# Patient Record
Sex: MALE | Race: WHITE | NOT HISPANIC OR LATINO | Employment: FULL TIME | URBAN - METROPOLITAN AREA
[De-identification: names, ages, dates, MRNs, and addresses within clinical notes are randomized per-mention and may not be internally consistent; named-entity substitution may affect disease eponyms.]

---

## 2017-02-07 ENCOUNTER — HOSPITAL ENCOUNTER (OUTPATIENT)
Dept: LAB | Facility: HOSPITAL | Age: 42
Discharge: HOME | End: 2017-02-07
Attending: FAMILY MEDICINE | Admitting: FAMILY MEDICINE

## 2017-02-07 LAB
ALBUMIN SERPL-MCNC: 3.5 G/DL (ref 3.5–4.8)
ALBUMIN/GLOB SERPL: 1.1 {RATIO} (ref 1–1.7)
ALP SERPL-CCNC: 78 IU/L (ref 32–91)
ALT SERPL-CCNC: 19 IU/L (ref 17–63)
ANION GAP SERPL CALC-SCNC: 13.1 MMOL/L (ref 10–20)
AST SERPL-CCNC: 17 IU/L (ref 15–41)
BILIRUB SERPL-MCNC: 0.5 MG/DL (ref 0.3–1.2)
BUN SERPL-MCNC: 8 MG/DL (ref 8–20)
BUN/CREAT SERPL: 11.4 (ref 6.2–20.3)
CALCIUM SERPL-MCNC: 9.3 MG/DL (ref 8.9–10.3)
CHLORIDE SERPL-SCNC: 104 MMOL/L (ref 101–111)
CHOLEST SERPL-MCNC: 198 MG/DL
CHOLEST/HDLC SERPL: 7.9 {RATIO}
CONV CO2: 27 MMOL/L (ref 22–32)
CONV LDL CHOLESTEROL DIRECT: 119 MG/DL (ref 0–100)
CONV TOTAL PROTEIN: 6.7 G/DL (ref 6.1–7.9)
CREAT UR-MCNC: 0.7 MG/DL (ref 0.7–1.2)
GLOBULIN UR ELPH-MCNC: 3.2 G/DL (ref 2.5–3.8)
GLUCOSE SERPL-MCNC: 174 MG/DL (ref 65–99)
HDLC SERPL-MCNC: 25 MG/DL
LDLC/HDLC SERPL: 4.8 {RATIO}
LIPID INTERPRETATION: ABNORMAL
POTASSIUM SERPL-SCNC: 4.1 MMOL/L (ref 3.6–5.1)
SODIUM SERPL-SCNC: 140 MMOL/L (ref 136–144)
TRIGL SERPL-MCNC: 291 MG/DL
VLDLC SERPL CALC-MCNC: 53.9 MG/DL

## 2017-08-09 ENCOUNTER — HOSPITAL ENCOUNTER (OUTPATIENT)
Dept: LAB | Facility: HOSPITAL | Age: 42
Discharge: HOME | End: 2017-08-09
Attending: FAMILY MEDICINE | Admitting: FAMILY MEDICINE

## 2017-08-09 LAB
ALBUMIN SERPL-MCNC: 3.9 G/DL (ref 3.5–4.8)
ALBUMIN/GLOB SERPL: 1.3 {RATIO} (ref 1–1.7)
ALP SERPL-CCNC: 83 IU/L (ref 32–91)
ALT SERPL-CCNC: 21 IU/L (ref 17–63)
ANION GAP SERPL CALC-SCNC: 13.9 MMOL/L (ref 10–20)
AST SERPL-CCNC: 18 IU/L (ref 15–41)
BILIRUB SERPL-MCNC: 0.6 MG/DL (ref 0.3–1.2)
BUN SERPL-MCNC: 9 MG/DL (ref 8–20)
BUN/CREAT SERPL: 12.9 (ref 6.2–20.3)
CALCIUM SERPL-MCNC: 9.2 MG/DL (ref 8.9–10.3)
CHLORIDE SERPL-SCNC: 103 MMOL/L (ref 101–111)
CHOLEST SERPL-MCNC: 164 MG/DL
CHOLEST/HDLC SERPL: 6.1 {RATIO}
CONV CO2: 25 MMOL/L (ref 22–32)
CONV LDL CHOLESTEROL DIRECT: 92 MG/DL (ref 0–100)
CONV TOTAL PROTEIN: 6.9 G/DL (ref 6.1–7.9)
CREAT UR-MCNC: 0.7 MG/DL (ref 0.7–1.2)
GLOBULIN UR ELPH-MCNC: 3 G/DL (ref 2.5–3.8)
GLUCOSE SERPL-MCNC: 262 MG/DL (ref 65–99)
HDLC SERPL-MCNC: 27 MG/DL
LDLC/HDLC SERPL: 3.4 {RATIO}
LIPID INTERPRETATION: ABNORMAL
POTASSIUM SERPL-SCNC: 3.9 MMOL/L (ref 3.6–5.1)
SODIUM SERPL-SCNC: 138 MMOL/L (ref 136–144)
T4 FREE SERPL-MCNC: 0.83 NG/DL (ref 0.58–1.64)
TRIGL SERPL-MCNC: 218 MG/DL
TSH SERPL-ACNC: 1.69 UIU/ML (ref 0.34–5.6)
VLDLC SERPL CALC-MCNC: 45.1 MG/DL

## 2018-02-06 ENCOUNTER — HOSPITAL ENCOUNTER (OUTPATIENT)
Dept: LAB | Facility: HOSPITAL | Age: 43
Discharge: HOME | End: 2018-02-06
Attending: FAMILY MEDICINE | Admitting: FAMILY MEDICINE

## 2018-02-06 LAB
ALBUMIN SERPL-MCNC: 4 G/DL (ref 3.5–4.8)
ALBUMIN/GLOB SERPL: 1.2 {RATIO} (ref 1–1.7)
ALP SERPL-CCNC: 91 IU/L (ref 32–91)
ALT SERPL-CCNC: 22 IU/L (ref 17–63)
ANION GAP SERPL CALC-SCNC: 13.2 MMOL/L (ref 10–20)
AST SERPL-CCNC: 20 IU/L (ref 15–41)
BILIRUB SERPL-MCNC: 0.7 MG/DL (ref 0.3–1.2)
BUN SERPL-MCNC: 7 MG/DL (ref 8–20)
BUN/CREAT SERPL: 8.8 (ref 6.2–20.3)
CALCIUM SERPL-MCNC: 9.5 MG/DL (ref 8.9–10.3)
CHLORIDE SERPL-SCNC: 98 MMOL/L (ref 101–111)
CHOLEST SERPL-MCNC: 183 MG/DL
CHOLEST/HDLC SERPL: 6.1 {RATIO}
CONV CO2: 26 MMOL/L (ref 22–32)
CONV LDL CHOLESTEROL DIRECT: 107 MG/DL (ref 0–100)
CONV MICROALBUM.,U,RANDOM: 26 MG/L
CONV TOTAL PROTEIN: 7.3 G/DL (ref 6.1–7.9)
CREAT 24H UR-MCNC: 336.9 MG/DL
CREAT UR-MCNC: 0.8 MG/DL (ref 0.7–1.2)
GLOBULIN UR ELPH-MCNC: 3.3 G/DL (ref 2.5–3.8)
GLUCOSE SERPL-MCNC: 310 MG/DL (ref 65–99)
HDLC SERPL-MCNC: 30 MG/DL
LDLC/HDLC SERPL: 3.5 {RATIO}
LIPID INTERPRETATION: ABNORMAL
MICROALBUMIN/CREAT UR: 7.7 UG/MG
POTASSIUM SERPL-SCNC: 4.2 MMOL/L (ref 3.6–5.1)
SODIUM SERPL-SCNC: 133 MMOL/L (ref 136–144)
TRIGL SERPL-MCNC: 246 MG/DL
VLDLC SERPL CALC-MCNC: 46.3 MG/DL

## 2018-08-11 ENCOUNTER — HOSPITAL ENCOUNTER (OUTPATIENT)
Dept: LAB | Facility: HOSPITAL | Age: 43
Discharge: HOME | End: 2018-08-11
Attending: FAMILY MEDICINE | Admitting: FAMILY MEDICINE

## 2018-08-11 LAB
ALBUMIN SERPL-MCNC: 3.5 G/DL (ref 3.5–4.8)
ALBUMIN/GLOB SERPL: 1.3 {RATIO} (ref 1–1.7)
ALP SERPL-CCNC: 66 IU/L (ref 32–91)
ALT SERPL-CCNC: 13 IU/L (ref 17–63)
ANION GAP SERPL CALC-SCNC: 8.4 MMOL/L (ref 10–20)
AST SERPL-CCNC: 11 IU/L (ref 15–41)
BASOPHILS # BLD AUTO: 0.1 10*3/UL (ref 0–0.2)
BASOPHILS NFR BLD AUTO: 1 % (ref 0–2)
BILIRUB SERPL-MCNC: 0.4 MG/DL (ref 0.3–1.2)
BUN SERPL-MCNC: 7 MG/DL (ref 8–20)
BUN/CREAT SERPL: 10 (ref 6.2–20.3)
CALCIUM SERPL-MCNC: 8.9 MG/DL (ref 8.9–10.3)
CHLORIDE SERPL-SCNC: 104 MMOL/L (ref 101–111)
CHOLEST SERPL-MCNC: 144 MG/DL
CHOLEST/HDLC SERPL: 4.4 {RATIO}
CONV CO2: 29 MMOL/L (ref 22–32)
CONV LDL CHOLESTEROL DIRECT: 86 MG/DL (ref 0–100)
CONV TOTAL PROTEIN: 6.3 G/DL (ref 6.1–7.9)
CREAT UR-MCNC: 0.7 MG/DL (ref 0.7–1.2)
DIFFERENTIAL METHOD BLD: (no result)
EOSINOPHIL # BLD AUTO: 0.2 10*3/UL (ref 0–0.3)
EOSINOPHIL # BLD AUTO: 2 % (ref 0–3)
ERYTHROCYTE [DISTWIDTH] IN BLOOD BY AUTOMATED COUNT: 12.9 % (ref 11.5–14.5)
GLOBULIN UR ELPH-MCNC: 2.8 G/DL (ref 2.5–3.8)
GLUCOSE SERPL-MCNC: 141 MG/DL (ref 65–99)
HCT VFR BLD AUTO: 38.5 % (ref 40–54)
HDLC SERPL-MCNC: 32 MG/DL
HGB BLD-MCNC: 12.9 G/DL (ref 14–18)
LDLC/HDLC SERPL: 2.6 {RATIO}
LIPID INTERPRETATION: ABNORMAL
LYMPHOCYTES # BLD AUTO: 3.4 10*3/UL (ref 0.8–4.8)
LYMPHOCYTES NFR BLD AUTO: 37 % (ref 18–42)
MCH RBC QN AUTO: 28.7 PG (ref 26–32)
MCHC RBC AUTO-ENTMCNC: 33.5 G/DL (ref 32–36)
MCV RBC AUTO: 85.5 FL (ref 80–94)
MONOCYTES # BLD AUTO: 0.5 10*3/UL (ref 0.1–1.3)
MONOCYTES NFR BLD AUTO: 5 % (ref 2–11)
NEUTROPHILS # BLD AUTO: 5.1 10*3/UL (ref 2.3–8.6)
NEUTROPHILS NFR BLD AUTO: 55 % (ref 50–75)
NRBC BLD AUTO-RTO: 0 /100{WBCS}
NRBC/RBC NFR BLD MANUAL: 0 10*3/UL
PLATELET # BLD AUTO: 420 10*3/UL (ref 150–450)
PMV BLD AUTO: 6.5 FL (ref 7.4–10.4)
POTASSIUM SERPL-SCNC: 4.4 MMOL/L (ref 3.6–5.1)
RBC # BLD AUTO: 4.5 10*6/UL (ref 4.6–6)
SODIUM SERPL-SCNC: 137 MMOL/L (ref 136–144)
TRIGL SERPL-MCNC: 118 MG/DL
VLDLC SERPL CALC-MCNC: 26.1 MG/DL
WBC # BLD AUTO: 9.3 10*3/UL (ref 4.5–11.5)

## 2019-01-26 ENCOUNTER — HOSPITAL ENCOUNTER (OUTPATIENT)
Dept: LAB | Facility: HOSPITAL | Age: 44
Discharge: HOME | End: 2019-01-26
Attending: FAMILY MEDICINE | Admitting: FAMILY MEDICINE

## 2019-01-26 LAB
ALBUMIN SERPL-MCNC: 3.6 G/DL (ref 3.5–4.8)
ALBUMIN/GLOB SERPL: 1.3 {RATIO} (ref 1–1.7)
ALP SERPL-CCNC: 64 IU/L (ref 32–91)
ALT SERPL-CCNC: 12 IU/L (ref 17–63)
ANION GAP SERPL CALC-SCNC: 10.9 MMOL/L (ref 10–20)
AST SERPL-CCNC: 14 IU/L (ref 15–41)
BILIRUB SERPL-MCNC: 0.5 MG/DL (ref 0.3–1.2)
BUN SERPL-MCNC: 11 MG/DL (ref 8–20)
BUN/CREAT SERPL: 12.2 (ref 6.2–20.3)
CALCIUM SERPL-MCNC: 8.9 MG/DL (ref 8.9–10.3)
CHLORIDE SERPL-SCNC: 103 MMOL/L (ref 101–111)
CHOLEST SERPL-MCNC: 128 MG/DL
CHOLEST/HDLC SERPL: 3.2 {RATIO}
CONV CO2: 27 MMOL/L (ref 22–32)
CONV LDL CHOLESTEROL DIRECT: 80 MG/DL (ref 0–100)
CONV MICROALBUM.,U,RANDOM: 13 MG/L
CONV TOTAL PROTEIN: 6.4 G/DL (ref 6.1–7.9)
CREAT 24H UR-MCNC: 196 MG/DL
CREAT UR-MCNC: 0.9 MG/DL (ref 0.7–1.2)
GLOBULIN UR ELPH-MCNC: 2.8 G/DL (ref 2.5–3.8)
GLUCOSE SERPL-MCNC: 104 MG/DL (ref 65–99)
HDLC SERPL-MCNC: 40 MG/DL
LDLC/HDLC SERPL: 2 {RATIO}
LIPID INTERPRETATION: NORMAL
MICROALBUMIN/CREAT UR: 6.6 UG/MG
POTASSIUM SERPL-SCNC: 3.9 MMOL/L (ref 3.6–5.1)
SODIUM SERPL-SCNC: 137 MMOL/L (ref 136–144)
TRIGL SERPL-MCNC: 59 MG/DL
VLDLC SERPL CALC-MCNC: 8 MG/DL

## 2019-09-15 ENCOUNTER — APPOINTMENT (OUTPATIENT)
Dept: CT IMAGING | Facility: HOSPITAL | Age: 44
End: 2019-09-15

## 2019-09-15 ENCOUNTER — HOSPITAL ENCOUNTER (EMERGENCY)
Facility: HOSPITAL | Age: 44
Discharge: HOME | End: 2019-09-15
Admitting: EMERGENCY MEDICINE

## 2019-09-15 VITALS
WEIGHT: 220 LBS | DIASTOLIC BLOOD PRESSURE: 128 MMHG | TEMPERATURE: 98.7 F | SYSTOLIC BLOOD PRESSURE: 161 MMHG | BODY MASS INDEX: 30.8 KG/M2 | HEIGHT: 71 IN | RESPIRATION RATE: 16 BRPM | HEART RATE: 113 BPM | OXYGEN SATURATION: 98 %

## 2019-09-15 DIAGNOSIS — H72.92 PERFORATED TYMPANIC MEMBRANE ON EXAMINATION, LEFT: ICD-10-CM

## 2019-09-15 DIAGNOSIS — H92.02 LEFT EAR PAIN: Primary | ICD-10-CM

## 2019-09-15 DIAGNOSIS — H65.92 MIDDLE EAR EFFUSION, LEFT: ICD-10-CM

## 2019-09-15 DIAGNOSIS — R42 DIZZINESS: ICD-10-CM

## 2019-09-15 LAB
ANION GAP SERPL CALCULATED.3IONS-SCNC: 14.5 MMOL/L (ref 5–15)
BASOPHILS # BLD AUTO: 0.1 10*3/MM3 (ref 0–0.2)
BASOPHILS NFR BLD AUTO: 0.9 % (ref 0–1.5)
BUN BLD-MCNC: 7 MG/DL (ref 8–20)
BUN/CREAT SERPL: 7.8 (ref 6.2–20.3)
CALCIUM SPEC-SCNC: 8.5 MG/DL (ref 8.9–10.3)
CHLORIDE SERPL-SCNC: 101 MMOL/L (ref 101–111)
CO2 SERPL-SCNC: 24 MMOL/L (ref 22–32)
CREAT BLD-MCNC: 0.9 MG/DL (ref 0.7–1.2)
DEPRECATED RDW RBC AUTO: 42.4 FL (ref 37–54)
EOSINOPHIL # BLD AUTO: 0.1 10*3/MM3 (ref 0–0.4)
EOSINOPHIL NFR BLD AUTO: 1.3 % (ref 0.3–6.2)
ERYTHROCYTE [DISTWIDTH] IN BLOOD BY AUTOMATED COUNT: 13.4 % (ref 12.3–15.4)
GFR SERPL CREATININE-BSD FRML MDRD: 93 ML/MIN/1.73
GLUCOSE BLD-MCNC: 145 MG/DL (ref 65–99)
HCT VFR BLD AUTO: 36 % (ref 37.5–51)
HGB BLD-MCNC: 12.2 G/DL (ref 13–17.7)
LYMPHOCYTES # BLD AUTO: 2.7 10*3/MM3 (ref 0.7–3.1)
LYMPHOCYTES NFR BLD AUTO: 29.4 % (ref 19.6–45.3)
MCH RBC QN AUTO: 30.5 PG (ref 26.6–33)
MCHC RBC AUTO-ENTMCNC: 33.9 G/DL (ref 31.5–35.7)
MCV RBC AUTO: 89.8 FL (ref 79–97)
MONOCYTES # BLD AUTO: 0.7 10*3/MM3 (ref 0.1–0.9)
MONOCYTES NFR BLD AUTO: 7.7 % (ref 5–12)
NEUTROPHILS # BLD AUTO: 5.6 10*3/MM3 (ref 1.7–7)
NEUTROPHILS NFR BLD AUTO: 60.7 % (ref 42.7–76)
NRBC BLD AUTO-RTO: 0 /100 WBC (ref 0–0.2)
PLATELET # BLD AUTO: 393 10*3/MM3 (ref 140–450)
PMV BLD AUTO: 7.7 FL (ref 6–12)
POTASSIUM BLD-SCNC: 3.5 MMOL/L (ref 3.6–5.1)
RBC # BLD AUTO: 4.01 10*6/MM3 (ref 4.14–5.8)
SODIUM BLD-SCNC: 136 MMOL/L (ref 136–144)
WBC NRBC COR # BLD: 9.1 10*3/MM3 (ref 3.4–10.8)

## 2019-09-15 PROCEDURE — 93005 ELECTROCARDIOGRAM TRACING: CPT | Performed by: PHYSICIAN ASSISTANT

## 2019-09-15 PROCEDURE — 25010000002 DIPHENHYDRAMINE PER 50 MG: Performed by: PHYSICIAN ASSISTANT

## 2019-09-15 PROCEDURE — 96375 TX/PRO/DX INJ NEW DRUG ADDON: CPT

## 2019-09-15 PROCEDURE — 25010000002 KETOROLAC TROMETHAMINE PER 15 MG: Performed by: PHYSICIAN ASSISTANT

## 2019-09-15 PROCEDURE — 96374 THER/PROPH/DIAG INJ IV PUSH: CPT

## 2019-09-15 PROCEDURE — 80048 BASIC METABOLIC PNL TOTAL CA: CPT | Performed by: PHYSICIAN ASSISTANT

## 2019-09-15 PROCEDURE — 70450 CT HEAD/BRAIN W/O DYE: CPT

## 2019-09-15 PROCEDURE — 85025 COMPLETE CBC W/AUTO DIFF WBC: CPT | Performed by: PHYSICIAN ASSISTANT

## 2019-09-15 PROCEDURE — 94770: CPT

## 2019-09-15 PROCEDURE — 99284 EMERGENCY DEPT VISIT MOD MDM: CPT

## 2019-09-15 RX ORDER — ALPRAZOLAM 1 MG/1
1 TABLET ORAL 4 TIMES DAILY PRN
COMMUNITY

## 2019-09-15 RX ORDER — QUETIAPINE FUMARATE 50 MG/1
50 TABLET, FILM COATED ORAL 2 TIMES DAILY
COMMUNITY

## 2019-09-15 RX ORDER — LISINOPRIL 40 MG/1
40 TABLET ORAL DAILY
COMMUNITY

## 2019-09-15 RX ORDER — HYDRALAZINE HYDROCHLORIDE 25 MG/1
25 TABLET, FILM COATED ORAL DAILY
COMMUNITY

## 2019-09-15 RX ORDER — SODIUM CHLORIDE 0.9 % (FLUSH) 0.9 %
10 SYRINGE (ML) INJECTION AS NEEDED
Status: DISCONTINUED | OUTPATIENT
Start: 2019-09-15 | End: 2019-09-15 | Stop reason: HOSPADM

## 2019-09-15 RX ORDER — ATENOLOL 25 MG/1
25 TABLET ORAL DAILY
COMMUNITY

## 2019-09-15 RX ORDER — CIPROFLOXACIN AND DEXAMETHASONE 3; 1 MG/ML; MG/ML
4 SUSPENSION/ DROPS AURICULAR (OTIC) 2 TIMES DAILY
Qty: 7.5 ML | Refills: 0 | Status: SHIPPED | OUTPATIENT
Start: 2019-09-15

## 2019-09-15 RX ORDER — PREDNISONE 20 MG/1
20 TABLET ORAL DAILY
Qty: 5 TABLET | Refills: 0 | Status: SHIPPED | OUTPATIENT
Start: 2019-09-15 | End: 2019-09-20

## 2019-09-15 RX ORDER — MECLIZINE HYDROCHLORIDE 25 MG/1
25 TABLET ORAL ONCE
Status: COMPLETED | OUTPATIENT
Start: 2019-09-15 | End: 2019-09-15

## 2019-09-15 RX ORDER — PAROXETINE 30 MG/1
60 TABLET, FILM COATED ORAL EVERY MORNING
COMMUNITY

## 2019-09-15 RX ORDER — DIPHENHYDRAMINE HYDROCHLORIDE 50 MG/ML
25 INJECTION INTRAMUSCULAR; INTRAVENOUS ONCE
Status: COMPLETED | OUTPATIENT
Start: 2019-09-15 | End: 2019-09-15

## 2019-09-15 RX ORDER — CYCLOBENZAPRINE HCL 10 MG
10 TABLET ORAL 2 TIMES DAILY PRN
COMMUNITY

## 2019-09-15 RX ORDER — PIOGLITAZONEHYDROCHLORIDE 30 MG/1
45 TABLET ORAL DAILY
COMMUNITY

## 2019-09-15 RX ORDER — AMLODIPINE BESYLATE 10 MG/1
5 TABLET ORAL 2 TIMES DAILY
COMMUNITY

## 2019-09-15 RX ORDER — AMOXICILLIN AND CLAVULANATE POTASSIUM 875; 125 MG/1; MG/1
1 TABLET, FILM COATED ORAL 2 TIMES DAILY
Qty: 20 TABLET | Refills: 0 | Status: SHIPPED | OUTPATIENT
Start: 2019-09-15 | End: 2019-10-23 | Stop reason: HOSPADM

## 2019-09-15 RX ORDER — KETOROLAC TROMETHAMINE 30 MG/ML
30 INJECTION, SOLUTION INTRAMUSCULAR; INTRAVENOUS ONCE
Status: COMPLETED | OUTPATIENT
Start: 2019-09-15 | End: 2019-09-15

## 2019-09-15 RX ADMIN — MECLIZINE HYDROCLORIDE 25 MG: 25 TABLET ORAL at 08:40

## 2019-09-15 RX ADMIN — KETOROLAC TROMETHAMINE 30 MG: 30 INJECTION, SOLUTION INTRAMUSCULAR at 08:40

## 2019-09-15 RX ADMIN — DIPHENHYDRAMINE HYDROCHLORIDE 25 MG: 50 INJECTION, SOLUTION INTRAMUSCULAR; INTRAVENOUS at 08:40

## 2019-09-15 NOTE — ED PROVIDER NOTES
"Subjective   Patient is a 43-year-old male with history of diabetes, hypertension, hyperlipidemia who presents complaining of left facial numbness left ear pain for 1 week.  He states that his sister passed away on 9/7 since then he has had the left sided numbness.  He verbalizes that he felt like the left side of his face was \"drooping\".  He also states that he has increased left ear pain, and describes it as \"I feel like someone stuck a pencil in my left ear\".  He reports some clear left ear drainage. He is also complaining of some discomfort and redness in his left eye.  he is also complaining of headache mostly on the left side, does not radiate, is constant and rates it a 10/10.  He denies history of migraines but states that feels like this is a migraine.  He denies numbness or tingling in his upper and lower extremities.  He denies any chest pain, shortness of breath, abdominal pain, nausea, vomiting, fever.         History provided by:  Patient      Review of Systems   Constitutional: Negative for fever.   HENT: Positive for ear discharge and ear pain. Negative for congestion, hearing loss, rhinorrhea, sinus pressure, sinus pain, sore throat, tinnitus and trouble swallowing.         Left ear pain   Eyes: Positive for pain, redness and visual disturbance.        Blurry vision left eye   Respiratory: Negative for cough, chest tightness, shortness of breath and wheezing.    Cardiovascular: Negative for chest pain and palpitations.   Gastrointestinal: Negative for abdominal pain, diarrhea, nausea and vomiting.   Genitourinary: Negative for dysuria and flank pain.   Musculoskeletal: Negative for back pain and neck pain.   Skin: Negative for rash.   Neurological: Positive for dizziness, light-headedness, numbness and headaches. Negative for syncope, speech difficulty and weakness.        Left face numbness   Psychiatric/Behavioral: Negative for behavioral problems.   All other systems reviewed and are " negative.      Past Medical History:   Diagnosis Date   • Depression    • Diabetes mellitus (CMS/HCC)    • Hyperlipidemia    • Hypertension        Allergies   Allergen Reactions   • Prochlorperazine Unknown (See Comments)       Past Surgical History:   Procedure Laterality Date   • BACK SURGERY         History reviewed. No pertinent family history.    Social History     Socioeconomic History   • Marital status: Single     Spouse name: Not on file   • Number of children: Not on file   • Years of education: Not on file   • Highest education level: Not on file   Tobacco Use   • Smoking status: Never Smoker   Substance and Sexual Activity   • Alcohol use: No     Frequency: Never   • Drug use: No   • Sexual activity: No           Objective   Physical Exam   Constitutional: He is oriented to person, place, and time. He appears well-developed and well-nourished.   HENT:   Head: Normocephalic.   Left Ear: External ear normal. No lacerations. There is drainage and tenderness. No foreign bodies. No mastoid tenderness. Tympanic membrane is perforated. No decreased hearing is noted.   Mouth/Throat: Oropharynx is clear and moist.   Left ear external canal red, some discharge and TM appears ruptured.  TTP left tragus. Denies tenderness to left mastoid or post auricular area.   Eyes: EOM are normal. Pupils are equal, round, and reactive to light. Right eye exhibits no discharge.   Left conjunctiva redness   Neck: Normal range of motion. Neck supple.   Cardiovascular: Normal rate, regular rhythm, normal heart sounds and intact distal pulses.   Pulmonary/Chest: Effort normal and breath sounds normal. No respiratory distress. He has no wheezes. He exhibits no tenderness.   Abdominal: Soft. Bowel sounds are normal. He exhibits no distension. There is no tenderness. There is no rebound and no guarding.   Musculoskeletal: Normal range of motion.   Neurological: He is alert and oriented to person, place, and time. He has normal strength  "and normal reflexes. A sensory deficit is present.   Sensory deficit left side of face  [place] negative for nystagmus   Skin: Skin is warm and dry. Capillary refill takes less than 2 seconds.   Psychiatric: He has a normal mood and affect. His speech is normal and behavior is normal. Judgment and thought content normal. Cognition and memory are normal.   Vitals reviewed.      ECG 12 Lead    Date/Time: 9/15/2019 9:17 AM  Performed by: Rosemarie Umana PA  Authorized by: Rosemarie Umana PA   Interpreted by physician (Dr. Todd)  Comparison: compared with previous ECG from 4/8/2015  Comparison to previous ECG: Sinus tachycardia  Rhythm: sinus rhythm  Rate: normal  QRS axis: normal  Conduction: conduction normal  ST Segments: ST segments normal  T Waves: T waves normal  Other: no other findings  Clinical impression: normal ECG                   ED Course  ED Course as of Sep 15 1140   Sun Sep 15, 2019   1035 Head CT was reviewed shows left mastoid effusion.  Findings were discussed with Dr. Todd we discussed antibiotics steroids and topical otic drops  [MM]   1046 Imaging and results were discussed with the patient.  Advised patient that IV antibiotics steroids and drops  [MM]      ED Course User Index  [MM] Rosemarie Umana PA    BP (!) 161/128   Pulse 113   Temp 98.7 °F (37.1 °C) (Oral)   Resp 16   Ht 180.3 cm (71\")   Wt 99.8 kg (220 lb)   SpO2 98%   BMI 30.68 kg/m²   Labs Reviewed   BASIC METABOLIC PANEL - Abnormal; Notable for the following components:       Result Value    Glucose 145 (*)     BUN 7 (*)     Potassium 3.5 (*)     Calcium 8.5 (*)     All other components within normal limits   CBC WITH AUTO DIFFERENTIAL - Abnormal; Notable for the following components:    RBC 4.01 (*)     Hemoglobin 12.2 (*)     Hematocrit 36.0 (*)     All other components within normal limits   CBC AND DIFFERENTIAL    Narrative:     The following orders were created for panel order CBC & Differential.  Procedure   "                             Abnormality         Status                     ---------                               -----------         ------                     CBC Auto Differential[[id number]]        Abnormal            Final result                 Please view results for these tests on the individual orders.     Medications   sodium chloride 0.9 % flush 10 mL (not administered)   meclizine (ANTIVERT) tablet 25 mg (25 mg Oral Given 9/15/19 0840)   diphenhydrAMINE (BENADRYL) injection 25 mg (25 mg Intravenous Given 9/15/19 0840)   ketorolac (TORADOL) injection 30 mg (30 mg Intravenous Given 9/15/19 0840)     Ct Head Without Contrast    Result Date: 9/15/2019  1.No acute intracranial abnormality. Specifically, no evidence of hemorrhage, mass effect or midline shift 2.Small left mastoid effusion. Debris within the left external auditory canal extending into the left middle ear. Findings may be seen with otitis. Correlate clinically with otoscopic exam.  Electronically Signed By-Rafael Orozco On:9/15/2019 10:01 AM This report was finalized on [id number] by  Rafael Orozco, .                  MDM  Number of Diagnoses or Management Options  Dizziness:   Left ear pain:   Diagnosis management comments: MEDICAL DECISION  Comorbidities: Hypertension, diabetes mellitus, hyperlipidemia, depression  Differentials: Bell's palsy, otitis externa, perforated TM, mastoiditis; this list is not all inclusive and does not constitute the entirety of considered causes  Radiology interpretation:  Images reviewed by me and interpreted by radiologist,   CT head without  Final result by Rafael Orozco MD (09/15/19 10:01:23)    Impression:     1.No acute intracranial abnormality. Specifically, no evidence of  hemorrhage, mass effect or midline shift  2.Small left mastoid effusion. Debris within the left external auditory  canal extending into the left middle ear. Findings may be seen with  otitis. Correlate clinically with  otoscopic exam.     Electronically Signed By-Rafael Orozco On:9/15/2019 10:01 AM  This report was finalized on [id number] by  Rafael Orozco, .    Lab interpretation:  Labs viewed by me significant for, BMP glucose 145, BUN 7, potassium 3.5, calcium 8.5.  CBC WBC within normal limits, red blood cells 4.01.    Patient lab work and imaging were done as discussed above.  Patient was given meclizine Benadryl and Toradol for dizziness and headache.  On reevaluation patient reports that headache is subsided and has also decreased.  He reports some continued numbness in his face.  This has not radiated or gotten worse.  He denies hearing loss or ringing in the ears.  Discussed with the patient these findings and imaging results.  Strongly advised patient be admitted hospital for IV antibiotics and steroids due to sensory deficit on left side of the face and concern for facial nerve involvement.  Patient is adamant about not staying in the hospital tonight to attend his sister's  today.  Discussed with the patient at length regarding fits and risks of discharge, mastoiditis, hearing loss, increased drainage, worsening facial nerve involvement.  She was instructed to follow-up with his primary care provider within a week for reevaluation.  She was also advised to call ENT specialist tomorrow to schedule an appointment for evaluation.  Patient verbalized understanding.  Will be discharged on Augmentin, prednisone, Ciprodex otic drops.  Was advised to return to the ER for new or worsening symptoms, increased numbness, headache, drainage, fever.         Amount and/or Complexity of Data Reviewed  Clinical lab tests: reviewed and ordered  Tests in the radiology section of CPT®: reviewed and ordered  Tests in the medicine section of CPT®: reviewed        Final diagnoses:   Left ear pain   Dizziness   Middle ear effusion, left   Perforated tympanic membrane on examination, left              Rosemarie Umana,  PA  09/15/19 1132       Rosemarie Umana PA  09/15/19 1147

## 2019-09-15 NOTE — DISCHARGE INSTRUCTIONS
Patient to take Augmentin and otic drops for left infection.  Advised to take as prescribed and complete entire antibiotic.  Also to take prednisone steroids completion for the next 5 days.     Discussed all treatment options with the patient.  Advised patient on risks of mastoiditis, and patient must follow up with appropriate ENT this week.  Patient to return to the ER for new or worsening symptoms, hearing loss, increased pain, increased facial weakness or drooping, fever or headache, or increased drainage from left ear.    Avoid swimming.  Avoid excess water into left ear while bathing.  Keep Left ear clean.    Patient to follow-up with primary care this week.  Advised patient to follow-up with ENT.  To call [facility] tomorrow to schedule an appointment.

## 2019-10-20 ENCOUNTER — APPOINTMENT (OUTPATIENT)
Dept: GENERAL RADIOLOGY | Facility: HOSPITAL | Age: 44
End: 2019-10-20

## 2019-10-20 ENCOUNTER — HOSPITAL ENCOUNTER (OUTPATIENT)
Facility: HOSPITAL | Age: 44
Setting detail: OBSERVATION
Discharge: HOME | End: 2019-10-23
Attending: EMERGENCY MEDICINE | Admitting: INTERNAL MEDICINE

## 2019-10-20 ENCOUNTER — APPOINTMENT (OUTPATIENT)
Dept: CT IMAGING | Facility: HOSPITAL | Age: 44
End: 2019-10-20

## 2019-10-20 DIAGNOSIS — R07.9 CHEST PAIN, UNSPECIFIED TYPE: Primary | ICD-10-CM

## 2019-10-20 DIAGNOSIS — K55.9 COLITIS, ISCHEMIC (HCC): ICD-10-CM

## 2019-10-20 DIAGNOSIS — A04.9 BACTERIAL COLITIS: ICD-10-CM

## 2019-10-20 DIAGNOSIS — R07.2 PRECORDIAL PAIN: ICD-10-CM

## 2019-10-20 LAB
ALBUMIN SERPL-MCNC: 4.2 G/DL (ref 3.5–5.2)
ALBUMIN/GLOB SERPL: 1.2 G/DL
ALP SERPL-CCNC: 96 U/L (ref 39–117)
ALT SERPL W P-5'-P-CCNC: 12 U/L (ref 1–41)
AMPHET+METHAMPHET UR QL: NEGATIVE
ANION GAP SERPL CALCULATED.3IONS-SCNC: 20 MMOL/L (ref 5–15)
AST SERPL-CCNC: 16 U/L (ref 1–40)
BARBITURATES UR QL SCN: NEGATIVE
BASOPHILS # BLD AUTO: 0.1 10*3/MM3 (ref 0–0.2)
BASOPHILS NFR BLD AUTO: 0.5 % (ref 0–1.5)
BENZODIAZ UR QL SCN: POSITIVE
BILIRUB SERPL-MCNC: 0.5 MG/DL (ref 0.2–1.2)
BILIRUB UR QL STRIP: NEGATIVE
BUN BLD-MCNC: 8 MG/DL (ref 6–20)
BUN/CREAT SERPL: 8.2 (ref 7–25)
CALCIUM SPEC-SCNC: 9.6 MG/DL (ref 8.6–10.5)
CANNABINOIDS SERPL QL: POSITIVE
CHLORIDE SERPL-SCNC: 94 MMOL/L (ref 98–107)
CLARITY UR: CLEAR
CO2 SERPL-SCNC: 18 MMOL/L (ref 22–29)
COCAINE UR QL: NEGATIVE
COLOR UR: YELLOW
CREAT BLD-MCNC: 0.98 MG/DL (ref 0.76–1.27)
D DIMER PPP FEU-MCNC: 1.65 MCGFEU/ML (ref 0.17–0.59)
D-LACTATE SERPL-SCNC: 1.5 MMOL/L (ref 0.5–2)
DEPRECATED RDW RBC AUTO: 42 FL (ref 37–54)
EOSINOPHIL # BLD AUTO: 0 10*3/MM3 (ref 0–0.4)
EOSINOPHIL NFR BLD AUTO: 0 % (ref 0.3–6.2)
ERYTHROCYTE [DISTWIDTH] IN BLOOD BY AUTOMATED COUNT: 13.2 % (ref 12.3–15.4)
GFR SERPL CREATININE-BSD FRML MDRD: 84 ML/MIN/1.73
GLOBULIN UR ELPH-MCNC: 3.6 GM/DL
GLUCOSE BLD-MCNC: 167 MG/DL (ref 65–99)
GLUCOSE BLDC GLUCOMTR-MCNC: 156 MG/DL (ref 70–105)
GLUCOSE BLDC GLUCOMTR-MCNC: 161 MG/DL (ref 70–105)
GLUCOSE UR STRIP-MCNC: NEGATIVE MG/DL
HCT VFR BLD AUTO: 42.6 % (ref 37.5–51)
HGB BLD-MCNC: 14.2 G/DL (ref 13–17.7)
HGB UR QL STRIP.AUTO: NEGATIVE
HOLD SPECIMEN: NORMAL
HOLD SPECIMEN: NORMAL
KETONES UR QL STRIP: NEGATIVE
LEUKOCYTE ESTERASE UR QL STRIP.AUTO: NEGATIVE
LIPASE SERPL-CCNC: 7 U/L (ref 13–60)
LYMPHOCYTES # BLD AUTO: 2.1 10*3/MM3 (ref 0.7–3.1)
LYMPHOCYTES NFR BLD AUTO: 10.1 % (ref 19.6–45.3)
MCH RBC QN AUTO: 30.1 PG (ref 26.6–33)
MCHC RBC AUTO-ENTMCNC: 33.3 G/DL (ref 31.5–35.7)
MCV RBC AUTO: 90.3 FL (ref 79–97)
METHADONE UR QL SCN: NEGATIVE
MONOCYTES # BLD AUTO: 1.2 10*3/MM3 (ref 0.1–0.9)
MONOCYTES NFR BLD AUTO: 5.5 % (ref 5–12)
NEUTROPHILS # BLD AUTO: 17.6 10*3/MM3 (ref 1.7–7)
NEUTROPHILS NFR BLD AUTO: 83.9 % (ref 42.7–76)
NITRITE UR QL STRIP: NEGATIVE
NRBC BLD AUTO-RTO: 0 /100 WBC (ref 0–0.2)
OPIATES UR QL: POSITIVE
OXYCODONE UR QL SCN: NEGATIVE
PH UR STRIP.AUTO: 6.5 [PH] (ref 5–8)
PLATELET # BLD AUTO: 362 10*3/MM3 (ref 140–450)
PMV BLD AUTO: 7.3 FL (ref 6–12)
POTASSIUM BLD-SCNC: 4.1 MMOL/L (ref 3.5–5.2)
PROT SERPL-MCNC: 7.8 G/DL (ref 6–8.5)
PROT UR QL STRIP: NEGATIVE
RBC # BLD AUTO: 4.72 10*6/MM3 (ref 4.14–5.8)
SODIUM BLD-SCNC: 132 MMOL/L (ref 136–145)
SP GR UR STRIP: 1.01 (ref 1–1.03)
TROPONIN T SERPL-MCNC: <0.01 NG/ML (ref 0–0.03)
UROBILINOGEN UR QL STRIP: NORMAL
WBC NRBC COR # BLD: 21 10*3/MM3 (ref 3.4–10.8)
WHOLE BLOOD HOLD SPECIMEN: NORMAL
WHOLE BLOOD HOLD SPECIMEN: NORMAL

## 2019-10-20 PROCEDURE — 85379 FIBRIN DEGRADATION QUANT: CPT | Performed by: EMERGENCY MEDICINE

## 2019-10-20 PROCEDURE — 80053 COMPREHEN METABOLIC PANEL: CPT | Performed by: EMERGENCY MEDICINE

## 2019-10-20 PROCEDURE — 83690 ASSAY OF LIPASE: CPT | Performed by: FAMILY MEDICINE

## 2019-10-20 PROCEDURE — 80307 DRUG TEST PRSMV CHEM ANLYZR: CPT | Performed by: EMERGENCY MEDICINE

## 2019-10-20 PROCEDURE — 82962 GLUCOSE BLOOD TEST: CPT

## 2019-10-20 PROCEDURE — 99284 EMERGENCY DEPT VISIT MOD MDM: CPT

## 2019-10-20 PROCEDURE — 0 IOPAMIDOL PER 1 ML: Performed by: EMERGENCY MEDICINE

## 2019-10-20 PROCEDURE — 83605 ASSAY OF LACTIC ACID: CPT | Performed by: FAMILY MEDICINE

## 2019-10-20 PROCEDURE — 96361 HYDRATE IV INFUSION ADD-ON: CPT

## 2019-10-20 PROCEDURE — 74176 CT ABD & PELVIS W/O CONTRAST: CPT

## 2019-10-20 PROCEDURE — 81003 URINALYSIS AUTO W/O SCOPE: CPT | Performed by: FAMILY MEDICINE

## 2019-10-20 PROCEDURE — 93005 ELECTROCARDIOGRAM TRACING: CPT | Performed by: EMERGENCY MEDICINE

## 2019-10-20 PROCEDURE — G0378 HOSPITAL OBSERVATION PER HR: HCPCS

## 2019-10-20 PROCEDURE — 71045 X-RAY EXAM CHEST 1 VIEW: CPT

## 2019-10-20 PROCEDURE — 83036 HEMOGLOBIN GLYCOSYLATED A1C: CPT | Performed by: FAMILY MEDICINE

## 2019-10-20 PROCEDURE — 71275 CT ANGIOGRAPHY CHEST: CPT

## 2019-10-20 PROCEDURE — 99219 PR INITIAL OBSERVATION CARE/DAY 50 MINUTES: CPT | Performed by: FAMILY MEDICINE

## 2019-10-20 PROCEDURE — 84484 ASSAY OF TROPONIN QUANT: CPT | Performed by: EMERGENCY MEDICINE

## 2019-10-20 PROCEDURE — 85025 COMPLETE CBC W/AUTO DIFF WBC: CPT | Performed by: EMERGENCY MEDICINE

## 2019-10-20 RX ORDER — NICOTINE POLACRILEX 4 MG
15 LOZENGE BUCCAL
Status: DISCONTINUED | OUTPATIENT
Start: 2019-10-20 | End: 2019-10-23 | Stop reason: HOSPADM

## 2019-10-20 RX ORDER — SUCRALFATE 1 G/1
1 TABLET ORAL
Status: DISCONTINUED | OUTPATIENT
Start: 2019-10-20 | End: 2019-10-23 | Stop reason: HOSPADM

## 2019-10-20 RX ORDER — SODIUM CHLORIDE 0.9 % (FLUSH) 0.9 %
10 SYRINGE (ML) INJECTION EVERY 12 HOURS SCHEDULED
Status: DISCONTINUED | OUTPATIENT
Start: 2019-10-20 | End: 2019-10-23 | Stop reason: HOSPADM

## 2019-10-20 RX ORDER — LORAZEPAM 0.5 MG/1
1 TABLET ORAL ONCE
Status: COMPLETED | OUTPATIENT
Start: 2019-10-20 | End: 2019-10-20

## 2019-10-20 RX ORDER — PANTOPRAZOLE SODIUM 40 MG/10ML
40 INJECTION, POWDER, LYOPHILIZED, FOR SOLUTION INTRAVENOUS
Status: DISCONTINUED | OUTPATIENT
Start: 2019-10-21 | End: 2019-10-21

## 2019-10-20 RX ORDER — KETOROLAC TROMETHAMINE 30 MG/ML
30 INJECTION, SOLUTION INTRAMUSCULAR; INTRAVENOUS EVERY 6 HOURS PRN
Status: DISCONTINUED | OUTPATIENT
Start: 2019-10-20 | End: 2019-10-23 | Stop reason: HOSPADM

## 2019-10-20 RX ORDER — ATORVASTATIN CALCIUM 20 MG/1
20 TABLET, FILM COATED ORAL DAILY
COMMUNITY

## 2019-10-20 RX ORDER — ASPIRIN 81 MG/1
324 TABLET, CHEWABLE ORAL ONCE
Status: COMPLETED | OUTPATIENT
Start: 2019-10-20 | End: 2019-10-20

## 2019-10-20 RX ORDER — ONDANSETRON 2 MG/ML
4 INJECTION INTRAMUSCULAR; INTRAVENOUS EVERY 6 HOURS PRN
Status: DISCONTINUED | OUTPATIENT
Start: 2019-10-20 | End: 2019-10-23 | Stop reason: HOSPADM

## 2019-10-20 RX ORDER — CHOLECALCIFEROL (VITAMIN D3) 125 MCG
5 CAPSULE ORAL NIGHTLY PRN
Status: DISCONTINUED | OUTPATIENT
Start: 2019-10-20 | End: 2019-10-23 | Stop reason: HOSPADM

## 2019-10-20 RX ORDER — QUETIAPINE FUMARATE 25 MG/1
50 TABLET, FILM COATED ORAL 2 TIMES DAILY
Status: DISCONTINUED | OUTPATIENT
Start: 2019-10-20 | End: 2019-10-23 | Stop reason: HOSPADM

## 2019-10-20 RX ORDER — MAGNESIUM SULFATE HEPTAHYDRATE 40 MG/ML
2 INJECTION, SOLUTION INTRAVENOUS AS NEEDED
Status: DISCONTINUED | OUTPATIENT
Start: 2019-10-20 | End: 2019-10-23 | Stop reason: HOSPADM

## 2019-10-20 RX ORDER — HYDROCODONE BITARTRATE AND ACETAMINOPHEN 7.5; 325 MG/1; MG/1
1 TABLET ORAL EVERY 4 HOURS PRN
COMMUNITY

## 2019-10-20 RX ORDER — ACETAMINOPHEN 160 MG/5ML
650 SOLUTION ORAL EVERY 4 HOURS PRN
Status: DISCONTINUED | OUTPATIENT
Start: 2019-10-20 | End: 2019-10-23 | Stop reason: HOSPADM

## 2019-10-20 RX ORDER — POTASSIUM CHLORIDE 20 MEQ/1
40 TABLET, EXTENDED RELEASE ORAL AS NEEDED
Status: DISCONTINUED | OUTPATIENT
Start: 2019-10-20 | End: 2019-10-23 | Stop reason: HOSPADM

## 2019-10-20 RX ORDER — AMLODIPINE BESYLATE 5 MG/1
5 TABLET ORAL 2 TIMES DAILY
Status: DISCONTINUED | OUTPATIENT
Start: 2019-10-20 | End: 2019-10-21

## 2019-10-20 RX ORDER — POTASSIUM CHLORIDE 1.5 G/1.77G
40 POWDER, FOR SOLUTION ORAL AS NEEDED
Status: DISCONTINUED | OUTPATIENT
Start: 2019-10-20 | End: 2019-10-23 | Stop reason: HOSPADM

## 2019-10-20 RX ORDER — ACETAMINOPHEN 325 MG/1
650 TABLET ORAL EVERY 4 HOURS PRN
Status: DISCONTINUED | OUTPATIENT
Start: 2019-10-20 | End: 2019-10-23 | Stop reason: HOSPADM

## 2019-10-20 RX ORDER — SODIUM CHLORIDE 0.9 % (FLUSH) 0.9 %
10 SYRINGE (ML) INJECTION AS NEEDED
Status: DISCONTINUED | OUTPATIENT
Start: 2019-10-20 | End: 2019-10-23 | Stop reason: HOSPADM

## 2019-10-20 RX ORDER — CALCIUM CARBONATE 200(500)MG
2 TABLET,CHEWABLE ORAL 3 TIMES DAILY PRN
Status: DISCONTINUED | OUTPATIENT
Start: 2019-10-20 | End: 2019-10-23 | Stop reason: HOSPADM

## 2019-10-20 RX ORDER — LISINOPRIL 20 MG/1
40 TABLET ORAL DAILY
Status: DISCONTINUED | OUTPATIENT
Start: 2019-10-20 | End: 2019-10-21

## 2019-10-20 RX ORDER — MAGNESIUM SULFATE HEPTAHYDRATE 40 MG/ML
4 INJECTION, SOLUTION INTRAVENOUS AS NEEDED
Status: DISCONTINUED | OUTPATIENT
Start: 2019-10-20 | End: 2019-10-23 | Stop reason: HOSPADM

## 2019-10-20 RX ORDER — DULOXETIN HYDROCHLORIDE 60 MG/1
60 CAPSULE, DELAYED RELEASE ORAL DAILY
COMMUNITY

## 2019-10-20 RX ORDER — CYCLOBENZAPRINE HCL 10 MG
10 TABLET ORAL 2 TIMES DAILY PRN
Status: DISCONTINUED | OUTPATIENT
Start: 2019-10-20 | End: 2019-10-23 | Stop reason: HOSPADM

## 2019-10-20 RX ORDER — PAROXETINE HYDROCHLORIDE 20 MG/1
60 TABLET, FILM COATED ORAL EVERY MORNING
Status: DISCONTINUED | OUTPATIENT
Start: 2019-10-21 | End: 2019-10-23 | Stop reason: HOSPADM

## 2019-10-20 RX ORDER — ACETAMINOPHEN 650 MG/1
650 SUPPOSITORY RECTAL EVERY 4 HOURS PRN
Status: DISCONTINUED | OUTPATIENT
Start: 2019-10-20 | End: 2019-10-23 | Stop reason: HOSPADM

## 2019-10-20 RX ORDER — ATENOLOL 25 MG/1
25 TABLET ORAL DAILY
Status: DISCONTINUED | OUTPATIENT
Start: 2019-10-20 | End: 2019-10-23 | Stop reason: HOSPADM

## 2019-10-20 RX ORDER — SODIUM CHLORIDE, SODIUM LACTATE, POTASSIUM CHLORIDE, CALCIUM CHLORIDE 600; 310; 30; 20 MG/100ML; MG/100ML; MG/100ML; MG/100ML
125 INJECTION, SOLUTION INTRAVENOUS CONTINUOUS
Status: DISCONTINUED | OUTPATIENT
Start: 2019-10-20 | End: 2019-10-23 | Stop reason: HOSPADM

## 2019-10-20 RX ORDER — ALPRAZOLAM 1 MG/1
1 TABLET ORAL 4 TIMES DAILY PRN
Status: DISCONTINUED | OUTPATIENT
Start: 2019-10-20 | End: 2019-10-23 | Stop reason: HOSPADM

## 2019-10-20 RX ORDER — HYDROCODONE BITARTRATE AND ACETAMINOPHEN 7.5; 325 MG/1; MG/1
1 TABLET ORAL EVERY 4 HOURS PRN
Status: DISCONTINUED | OUTPATIENT
Start: 2019-10-20 | End: 2019-10-23 | Stop reason: HOSPADM

## 2019-10-20 RX ORDER — HYDRALAZINE HYDROCHLORIDE 25 MG/1
25 TABLET, FILM COATED ORAL DAILY
Status: DISCONTINUED | OUTPATIENT
Start: 2019-10-20 | End: 2019-10-21

## 2019-10-20 RX ORDER — DULOXETIN HYDROCHLORIDE 30 MG/1
60 CAPSULE, DELAYED RELEASE ORAL DAILY
Status: DISCONTINUED | OUTPATIENT
Start: 2019-10-20 | End: 2019-10-23 | Stop reason: HOSPADM

## 2019-10-20 RX ORDER — ONDANSETRON 4 MG/1
4 TABLET, FILM COATED ORAL EVERY 6 HOURS PRN
Status: DISCONTINUED | OUTPATIENT
Start: 2019-10-20 | End: 2019-10-23 | Stop reason: HOSPADM

## 2019-10-20 RX ORDER — ATORVASTATIN CALCIUM 20 MG/1
20 TABLET, FILM COATED ORAL DAILY
Status: DISCONTINUED | OUTPATIENT
Start: 2019-10-20 | End: 2019-10-23 | Stop reason: HOSPADM

## 2019-10-20 RX ORDER — DEXTROSE MONOHYDRATE 25 G/50ML
25 INJECTION, SOLUTION INTRAVENOUS
Status: DISCONTINUED | OUTPATIENT
Start: 2019-10-20 | End: 2019-10-23 | Stop reason: HOSPADM

## 2019-10-20 RX ADMIN — ALPRAZOLAM 1 MG: 1 TABLET ORAL at 20:48

## 2019-10-20 RX ADMIN — IOPAMIDOL 68 ML: 755 INJECTION, SOLUTION INTRAVENOUS at 15:14

## 2019-10-20 RX ADMIN — ASPIRIN 81 MG 324 MG: 81 TABLET ORAL at 13:10

## 2019-10-20 RX ADMIN — QUETIAPINE 50 MG: 25 TABLET, FILM COATED ORAL at 20:48

## 2019-10-20 RX ADMIN — HYDRALAZINE HYDROCHLORIDE 25 MG: 25 TABLET, FILM COATED ORAL at 18:33

## 2019-10-20 RX ADMIN — SODIUM CHLORIDE, SODIUM LACTATE, POTASSIUM CHLORIDE, AND CALCIUM CHLORIDE 125 ML/HR: 600; 310; 30; 20 INJECTION, SOLUTION INTRAVENOUS at 18:28

## 2019-10-20 RX ADMIN — LIDOCAINE HYDROCHLORIDE: 20 SOLUTION ORAL; TOPICAL at 20:46

## 2019-10-20 RX ADMIN — ATORVASTATIN CALCIUM 20 MG: 20 TABLET, FILM COATED ORAL at 18:33

## 2019-10-20 RX ADMIN — SUCRALFATE 1 G: 1 TABLET ORAL at 18:34

## 2019-10-20 RX ADMIN — HYDROCODONE BITARTRATE AND ACETAMINOPHEN 1 TABLET: 7.5; 325 TABLET ORAL at 20:48

## 2019-10-20 RX ADMIN — CYCLOBENZAPRINE HYDROCHLORIDE 10 MG: 10 TABLET, FILM COATED ORAL at 20:48

## 2019-10-20 RX ADMIN — DULOXETINE HYDROCHLORIDE 60 MG: 30 CAPSULE, DELAYED RELEASE ORAL at 18:35

## 2019-10-20 RX ADMIN — ATENOLOL 25 MG: 25 TABLET ORAL at 18:35

## 2019-10-20 RX ADMIN — LORAZEPAM 1 MG: 0.5 TABLET ORAL at 13:12

## 2019-10-20 RX ADMIN — SUCRALFATE 1 G: 1 TABLET ORAL at 20:48

## 2019-10-20 RX ADMIN — LISINOPRIL 40 MG: 20 TABLET ORAL at 18:34

## 2019-10-20 RX ADMIN — Medication 10 ML: at 20:49

## 2019-10-20 NOTE — ED PROVIDER NOTES
"Subjective   History of Present Illness  Chest pain  44-year-old male complains of severe pressure and weight on the top of his chest that started at some point this morning while he was still in bed states he had a syncopal episode when he tried to stand up.  He reports no radiating pain or cough or congestion or fevers or chills.    Review of Systems   Constitutional: Negative.    HENT: Negative.    Eyes: Negative.    Respiratory: Positive for chest tightness and shortness of breath.    Cardiovascular: Positive for chest pain. Negative for palpitations and leg swelling.   Gastrointestinal: Negative.    Endocrine: Negative.    Genitourinary: Negative.    Musculoskeletal: Negative.    Skin: Negative.    Neurological: Negative.    Hematological: Negative.    Psychiatric/Behavioral: The patient is nervous/anxious.        Past Medical History:   Diagnosis Date   • Depression    • Diabetes mellitus (CMS/HCC)    • Hyperlipidemia    • Hypertension        Allergies   Allergen Reactions   • Prochlorperazine Unknown (See Comments)       Past Surgical History:   Procedure Laterality Date   • BACK SURGERY         No family history on file.  Father with coronary artery disease  Sister reportedly  in her sleep last month of unknown cause  Social History     Socioeconomic History   • Marital status: Single     Spouse name: Not on file   • Number of children: Not on file   • Years of education: Not on file   • Highest education level: Not on file   Tobacco Use   • Smoking status: Never Smoker   Substance and Sexual Activity   • Alcohol use: No     Frequency: Never   • Drug use: No   • Sexual activity: No           Objective   Physical Exam  /75   Pulse 102   Temp 97.8 °F (36.6 °C) (Oral)   Resp 24   Ht 180.3 cm (71\")   Wt 99.8 kg (220 lb)   SpO2 98%   BMI 30.68 kg/m²   General: Well-developed well-appearing, diaphoretic, acute distress, anxious  Eyes: Pupils round and reactive, sclera nonicteric  HEENT: Mucous " membranes moist, no mucosal swelling  Neck: Supple, no nuchal rigidity, no lymphadenopathy  Respirations: Respirations nonlabored, equal breath sounds bilaterally, clear lungs  Heart regular rate and rhythm, no murmurs rubs or gallops,   Abdomen soft nontender nondistended, no hepatosplenomegaly, no hernia, no mass, normal bowel sounds, no CVA tenderness  Extremities no clubbing cyanosis or edema, calves are symmetric and nontender  Neuro cranial nerves grossly intact, no focal limb deficits  Psych oriented, anxious, hyperventilating  Skin no rash, brisk cap refill, diaphoretic  Procedures           ED Course        EKG shows sinus tachycardia rate of 107        Results for orders placed or performed during the hospital encounter of 10/20/19   Comprehensive Metabolic Panel   Result Value Ref Range    Glucose 167 (H) 65 - 99 mg/dL    BUN 8 6 - 20 mg/dL    Creatinine 0.98 0.76 - 1.27 mg/dL    Sodium 132 (L) 136 - 145 mmol/L    Potassium 4.1 3.5 - 5.2 mmol/L    Chloride 94 (L) 98 - 107 mmol/L    CO2 18.0 (L) 22.0 - 29.0 mmol/L    Calcium 9.6 8.6 - 10.5 mg/dL    Total Protein 7.8 6.0 - 8.5 g/dL    Albumin 4.20 3.50 - 5.20 g/dL    ALT (SGPT) 12 1 - 41 U/L    AST (SGOT) 16 1 - 40 U/L    Alkaline Phosphatase 96 39 - 117 U/L    Total Bilirubin 0.5 0.2 - 1.2 mg/dL    eGFR Non African Amer 84 >60 mL/min/1.73    Globulin 3.6 gm/dL    A/G Ratio 1.2 g/dL    BUN/Creatinine Ratio 8.2 7.0 - 25.0    Anion Gap 20.0 (H) 5.0 - 15.0 mmol/L   Troponin   Result Value Ref Range    Troponin T <0.010 0.000 - 0.030 ng/mL   CBC Auto Differential   Result Value Ref Range    WBC 21.00 (H) 3.40 - 10.80 10*3/mm3    RBC 4.72 4.14 - 5.80 10*6/mm3    Hemoglobin 14.2 13.0 - 17.7 g/dL    Hematocrit 42.6 37.5 - 51.0 %    MCV 90.3 79.0 - 97.0 fL    MCH 30.1 26.6 - 33.0 pg    MCHC 33.3 31.5 - 35.7 g/dL    RDW 13.2 12.3 - 15.4 %    RDW-SD 42.0 37.0 - 54.0 fl    MPV 7.3 6.0 - 12.0 fL    Platelets 362 140 - 450 10*3/mm3    Neutrophil % 83.9 (H) 42.7 - 76.0 %     Lymphocyte % 10.1 (L) 19.6 - 45.3 %    Monocyte % 5.5 5.0 - 12.0 %    Eosinophil % 0.0 (L) 0.3 - 6.2 %    Basophil % 0.5 0.0 - 1.5 %    Neutrophils, Absolute 17.60 (H) 1.70 - 7.00 10*3/mm3    Lymphocytes, Absolute 2.10 0.70 - 3.10 10*3/mm3    Monocytes, Absolute 1.20 (H) 0.10 - 0.90 10*3/mm3    Eosinophils, Absolute 0.00 0.00 - 0.40 10*3/mm3    Basophils, Absolute 0.10 0.00 - 0.20 10*3/mm3    nRBC 0.0 0.0 - 0.2 /100 WBC   Urine Drug Screen - Urine, Clean Catch   Result Value Ref Range    Amphet/Methamphet, Screen Negative Negative    Barbiturates Screen, Urine Negative Negative    Benzodiazepine Screen, Urine Positive (A) Negative    Cocaine Screen, Urine Negative Negative    Opiate Screen Positive (A) Negative    THC, Screen, Urine Positive (A) Negative    Methadone Screen, Urine Negative Negative    Oxycodone Screen, Urine Negative Negative   D-dimer, Quantitative   Result Value Ref Range    D-Dimer, Quantitative 1.65 (H) 0.17 - 0.59 MCGFEU/mL   Light Blue Top   Result Value Ref Range    Extra Tube hold for add-on    Green Top (Gel)   Result Value Ref Range    Extra Tube Hold for add-ons.    Lavender Top   Result Value Ref Range    Extra Tube hold for add-on    Gold Top - SST   Result Value Ref Range    Extra Tube Hold for add-ons.      Xr Chest 1 View    Result Date: 10/20/2019  Mildly limited study demonstrating low lung volumes with no active disease.  Electronically Signed By-Geovanny Cheema On:10/20/2019 1:23 PM This report was finalized on [id number] by  Geovanny Cheema, .    Ct Chest Pulmonary Embolism    Result Date: 10/20/2019   1. The study is negative for pulmonary embolism. No acute intrathoracic abnormality. 2. Low lung volumes with diffuse groundglass opacities throughout both lungs are likely represent atelectasis. 3. Partially included nonspecific thickening of the wall of the hepatic flexure. Evaluate for abdominal pain. Could consider correlation with colonoscopy if clinically indicated.   Electronically Signed By-Geovanny Cheema On:10/20/2019 3:30 PM This report was finalized on [id number] by  Geovanny Cheema, .        MDM  Patient presents with some chest pain and diaphoresis troponin was normal EKG showed no ischemic changes d-dimer is borderline elevated and a CT was negative for pulmonary embolus.  Patient does not have abdominal symptoms or tenderness and the CT finding of possible colonic wall thickening was nonspecific.  He does have however some leukocytosis.  He was resting her capillary examination.  His hyperventilation had improved he was no longer having the chest pressure.  He was stable on the monitor the case discussed with the hospitalist service and patient placed hospital observation for further chest pain evaluation.  Final diagnoses:   Chest pain, unspecified type              Mahamed Cardenas MD  10/20/19 1543

## 2019-10-20 NOTE — H&P
[facility]            Primary Care Provider:  Venancio Hurley MD    Patient Care Team:  Venancio Hurley MD as PCP - General    CHIEF COMPLAINT:     Chief Complaint   Patient presents with   • Chest Pain       Abdominal pain    HISTORY OF PRESENT ILLNESS:    Patient is a 44-year-old male with history of depression, hypertension, diabetes presenting for evaluation of 1 day of chest pain.  Patient reports a great deal of emotional strain recently because he found out his 32-year-old sister  in her sleep suddenly currently unknown circumstances.  Patient reports upon getting out of bed this morning he has had a significant amount of abdominal pain, mostly periumbilical along with feelings of pressure in his abdomen and some along his chest.  Patient reports getting dizzy upon getting out of bed.  Patient now reports abdominal pain is primary symptom.  He denies any history of previous cardiac disease.  Diaphoresis noted earlier today.  No radiation of pain to jaw or down his arm.  Denies any constitutional infectious symptoms such as fevers, chills, fatigue.  No cough or sputum production.    Patient has first-generation family history of heart disease in his father who had his first cardiac intervention in his 50s for coronary artery disease.    In the emergency department patient is hemodynamically stable.  Labs significant for glucose 167, sodium of 132, potassium 4.1 however of note labs were hemolyzed uncertain of veracity.  White count 21.  Urine drug screen positive for opiates marijuana and benzodiazepines.  Initial troponins negative.  Emergency department concern for primary cardiac event.  EKG with significant artifact appears sinus no significant ST segment changes.  Patient had a CT of chest with contrast showing no PE or acute pulmonary findings.  Patient admitted for evaluation of abdominal pain.          Past Medical History:   Diagnosis Date   • Depression     • Diabetes mellitus (CMS/HCC)    • Hyperlipidemia    • Hypertension        Past Surgical History:   Procedure Laterality Date   • BACK SURGERY         No family history on file.    Social History     Tobacco Use   • Smoking status: Never Smoker   Substance Use Topics   • Alcohol use: No     Frequency: Never   • Drug use: No       Medications Prior to Admission   Medication Sig Dispense Refill Last Dose   • ALPRAZolam (XANAX) 1 MG tablet Take 1 mg by mouth 4 (Four) Times a Day As Needed for Anxiety.      • amLODIPine (NORVASC) 10 MG tablet Take 5 mg by mouth 2 (Two) Times a Day.      • atorvastatin (LIPITOR) 20 MG tablet Take 20 mg by mouth Daily.      • cyclobenzaprine (FLEXERIL) 10 MG tablet Take 10 mg by mouth 2 (Two) Times a Day As Needed for Muscle Spasms.      • DULoxetine (CYMBALTA) 60 MG capsule Take 60 mg by mouth Daily.      • hydrALAZINE (APRESOLINE) 25 MG tablet Take 25 mg by mouth Daily.      • HYDROcodone-acetaminophen (NORCO) 7.5-325 MG per tablet Take 1 tablet by mouth Every 4 (Four) Hours As Needed for Moderate Pain .      • lisinopril (PRINIVIL,ZESTRIL) 40 MG tablet Take 40 mg by mouth Daily.      • metFORMIN (GLUCOPHAGE) 1000 MG tablet Take 1,000 mg by mouth 2 (Two) Times a Day With Meals.      • PARoxetine (PAXIL) 30 MG tablet Take 60 mg by mouth Every Morning.      • pioglitazone (ACTOS) 30 MG tablet Take 45 mg by mouth Daily.      • QUEtiapine (SEROquel) 50 MG tablet Take 50 mg by mouth 2 (Two) Times a Day.      • amoxicillin-clavulanate (AUGMENTIN) 875-125 MG per tablet Take 1 tablet by mouth 2 (Two) Times a Day. 20 tablet 0 Unknown at Unknown time   • atenolol (TENORMIN) 25 MG tablet Take 25 mg by mouth Daily.   Unknown at Unknown time   • ciprofloxacin-dexamethasone (CIPRODEX) 0.3-0.1 % otic suspension Administer 4 drops into the left ear 2 (Two) Times a Day. 7.5 mL 0 Unknown at Unknown time       Allergies:  Prochlorperazine      There is no immunization history on file for this  "patient.        REVIEW OF SYSTEMS:     Review of Systems   Constitution: Positive for diaphoresis. Negative for chills and fever.   Cardiovascular: Negative for chest pain, irregular heartbeat and orthopnea.   Respiratory: Negative for cough and shortness of breath.    Musculoskeletal: Positive for back pain and joint pain.   Gastrointestinal: Positive for bloating, abdominal pain and diarrhea. Negative for bowel incontinence and jaundice.   Neurological: Positive for dizziness. Negative for focal weakness.   Psychiatric/Behavioral: Positive for substance abuse. The patient is nervous/anxious.        Vital Signs  Temp:  [97.8 °F (36.6 °C)] 97.8 °F (36.6 °C)  Heart Rate:  [] 102  Resp:  [24] 24  BP: (139-145)/(75-83) 144/75    Flowsheet Rows      First Filed Value   Admission Height  180.3 cm (71\") Documented at 10/20/2019 1249   Admission Weight  99.8 kg (220 lb) Documented at 10/20/2019 1249           Physical Exam:    Physical Exam   Constitutional: He is oriented to person, place, and time. He appears well-nourished. He appears distressed.   Obese male sitting in a wheelchair appearing tearful   HENT:   Head: Normocephalic and atraumatic.   Right Ear: External ear normal.   Left Ear: External ear normal.   Nose: Nose normal.   Mouth/Throat: Oropharynx is clear and moist. No oropharyngeal exudate.   Eyes: Conjunctivae and EOM are normal. Right eye exhibits no discharge. Left eye exhibits no discharge. No scleral icterus.   Neck: Normal range of motion. Neck supple. No tracheal deviation present.   Cardiovascular: Normal rate, regular rhythm and normal heart sounds.   No murmur heard.  Pulmonary/Chest: Effort normal and breath sounds normal. No stridor. No respiratory distress. He has no wheezes.   Abdominal: Soft. He exhibits no distension and no mass. There is tenderness.   Diffuse tenderness more prominent periumbilical   Musculoskeletal: Normal range of motion. He exhibits no edema or deformity. "   Neurological: He is alert and oriented to person, place, and time. No cranial nerve deficit. He exhibits normal muscle tone.   Skin: Skin is warm and dry. He is not diaphoretic. No erythema.             Results Review:      I reviewed the patient's new clinical results.    Lab Results (most recent)     Procedure Component Value Units Date/Time    Urine Drug Screen - Urine, Clean Catch [[id number]]  (Abnormal) Collected:  10/20/19 1403    Specimen:  Urine, Clean Catch Updated:  10/20/19 1459     Amphet/Methamphet, Screen Negative     Barbiturates Screen, Urine Negative     Benzodiazepine Screen, Urine Positive     Cocaine Screen, Urine Negative     Opiate Screen Positive     THC, Screen, Urine Positive     Methadone Screen, Urine Negative     Oxycodone Screen, Urine Negative    Narrative:       Negative Thresholds For Drugs Screened:     Amphetamines               500 ng/ml   Barbiturates               200 ng/ml   Benzodiazepines            100 ng/ml   Cocaine                    300 ng/ml   Methadone                  300 ng/ml   Opiates                    300 ng/ml   Oxycodone                  100 ng/ml   THC                        50 ng/ml    The Normal Value for all drugs tested is negative. This report includes final unconfirmed screening results to be used for medical treatment purposes only. Unconfirmed results must not be used for non-medical purposes such as employment or legal testing. Clinical consideration should be applied to any drug of abuse test, particulary when unconfirmed results are used.  All urine drugs of abuse requests without chain of custody are for medical screening purposes only.  False positives are possible.      D-dimer, Quantitative [[id number]]  (Abnormal) Collected:  10/20/19 1254    Specimen:  Blood Updated:  10/20/19 1423     D-Dimer, Quantitative 1.65 MCGFEU/mL     Narrative:       Reference Range  --------------------------------------------------------------------     < 0.50    Negative Predictive Value  0.50-0.59   Indeterminate    >= 0.60   Probable VTE             A very low percentage of patients with DVT may yield D-Dimer results   below the cut-off of 0.50 MCGFEU/mL.  This is known to be more   prevalent in patients with distal DVT.             Results of this test should always be interpreted in conjunction with   the patient's medical history, clinical presentation and other   findings.  Clinical diagnosis should not be based on the result of   [place] D-Dimer alone.    [place] Draw [[id number]] Collected:  10/20/19 1254    Specimen:  Blood Updated:  10/20/19 1400    Narrative:       The following orders were created for panel order [place] Draw.  Procedure                               Abnormality         Status                     ---------                               -----------         ------                     Light Blue Top[[id number]]                                   Final result               Green Top (Gel)[[id number]]                                  Final result               Lavender Top[[id number]]                                     Final result               Gold Top - SST[[id number]]                                   Final result                 Please view results for these tests on the individual orders.    Light Blue Top [[id number]] Collected:  10/20/19 1254    Specimen:  Blood Updated:  10/20/19 1400     Extra Tube hold for add-on     Comment: Auto resulted       Green Top (Gel) [[id number]] Collected:  10/20/19 1254    Specimen:  Blood Updated:  10/20/19 1400     Extra Tube Hold for add-ons.     Comment: Auto resulted.       Lavender Top [[id number]] Collected:  10/20/19 1254    Specimen:  Blood Updated:  10/20/19 1400     Extra Tube hold for add-on     Comment: Auto resulted       Gold Top - SST [[id number]] Collected:  10/20/19 1254    Specimen:  Blood Updated:  10/20/19 1400     Extra Tube Hold for add-ons.     Comment: Auto resulted.       Comprehensive  Metabolic Panel [[id number]]  (Abnormal) Collected:  10/20/19 1254    Specimen:  Blood Updated:  10/20/19 1343     Glucose 167 mg/dL      BUN 8 mg/dL      Creatinine 0.98 mg/dL      Sodium 132 mmol/L      Potassium 4.1 mmol/L      Comment: Specimen hemolyzed.  Results may be affected.        Chloride 94 mmol/L      CO2 18.0 mmol/L      Calcium 9.6 mg/dL      Total Protein 7.8 g/dL      Albumin 4.20 g/dL      ALT (SGPT) 12 U/L      Comment: Specimen hemolyzed.  Results may be affected.        AST (SGOT) 16 U/L      Comment: Specimen hemolyzed.  Results may be affected.        Alkaline Phosphatase 96 U/L      Total Bilirubin 0.5 mg/dL      eGFR Non African Amer 84 mL/min/1.73      Globulin 3.6 gm/dL      A/G Ratio 1.2 g/dL      BUN/Creatinine Ratio 8.2     Anion Gap 20.0 mmol/L     Narrative:       GFR Normal >60  Chronic Kidney Disease <60  Kidney Failure <15    Troponin [[id number]]  (Normal) Collected:  10/20/19 1254    Specimen:  Blood Updated:  10/20/19 1342     Troponin T <0.010 ng/mL     Narrative:       Troponin T Reference Range:  <= 0.03 ng/mL-   Negative for AMI  >0.03 ng/mL-     Abnormal for myocardial necrosis.  Clinicians would have to utilize clinical acumen, EKG, Troponin and serial changes to determine if it is an Acute Myocardial Infarction or myocardial injury due to an underlying chronic condition.     CBC & Differential [[id number]] Collected:  10/20/19 1254    Specimen:  Blood Updated:  10/20/19 1304    Narrative:       The following orders were created for panel order CBC & Differential.  Procedure                               Abnormality         Status                     ---------                               -----------         ------                     CBC Auto Differential[[id number]]        Abnormal            Final result                 Please view results for these tests on the individual orders.    CBC Auto Differential [[id number]]  (Abnormal) Collected:  10/20/19 1254    Specimen:   Blood Updated:  10/20/19 1304     WBC 21.00 10*3/mm3      RBC 4.72 10*6/mm3      Hemoglobin 14.2 g/dL      Hematocrit 42.6 %      MCV 90.3 fL      MCH 30.1 pg      MCHC 33.3 g/dL      RDW 13.2 %      RDW-SD 42.0 fl      MPV 7.3 fL      Platelets 362 10*3/mm3      Neutrophil % 83.9 %      Lymphocyte % 10.1 %      Monocyte % 5.5 %      Eosinophil % 0.0 %      Basophil % 0.5 %      Neutrophils, Absolute 17.60 10*3/mm3      Lymphocytes, Absolute 2.10 10*3/mm3      Monocytes, Absolute 1.20 10*3/mm3      Eosinophils, Absolute 0.00 10*3/mm3      Basophils, Absolute 0.10 10*3/mm3      nRBC 0.0 /100 WBC           Imaging Results (most recent)     Procedure Component Value Units Date/Time    CT Chest Pulmonary Embolism [[id number]] Collected:  10/20/19 1525     Updated:  10/20/19 1532    Narrative:          DATE OF EXAM:  10/20/2019 3:00 PM     PROCEDURE:  CT CHEST PULMONARY EMBOLISM-     INDICATIONS:   Stable chest pain. Short of breath.     COMPARISON:   Chest radiograph 10/20/2019.     TECHNIQUE:  Routine transaxial slices were obtained through chest after  administration of intravenous 68 ml of Isovue 370. Reconstructed coronal  and sagittal images were also obtained. Automated exposure control and  iterative reconstruction methods were used.      FINDINGS:  No evidence of a pulmonary arterial filling defect to suggest pulmonary  embolism. The heart and great vessels of the chest are normal in size.  No pericardial effusion. No pathologically enlarged intrathoracic lymph  nodes are identified. Heterogeneous enlarged thyroid gland.     Low lung volumes. Diffuse groundglass opacities throughout both lungs  that likely represent atelectasis. No focal lung consolidation. Central  airways are widely patent. No pneumothorax or pleural effusion.     Limited imaging of the upper abdomen partially includes nonspecific  thickening of the wall of the hepatic flexure.     Partially visualized lower cervical spinal fusion hardware. No  acute  osseous abnormality or concerning osseous lesion.        Impression:          1. The study is negative for pulmonary embolism. No acute intrathoracic  abnormality.  2. Low lung volumes with diffuse groundglass opacities throughout both  lungs are likely represent atelectasis.  3. Partially included nonspecific thickening of the wall of the hepatic  flexure. Evaluate for abdominal pain. Could consider correlation with  colonoscopy if clinically indicated.     Electronically Signed ByAtif Cheema On:10/20/2019 3:30 PM  This report was finalized on [id number] by  Geovanny Cheema, .    XR Chest 1 View [[id number]] Collected:  10/20/19 1322     Updated:  10/20/19 1325    Narrative:       DATE OF EXAM:  10/20/2019 1:09 PM     PROCEDURE:  XR CHEST 1 VW-     INDICATIONS:  Chest pain protocol     COMPARISON:  None available.     TECHNIQUE:   Single radiographic AP view of the chest was obtained.     FINDINGS:  The study is mildly limited by lordotic patient positioning and the  patient's body habitus. Overlying artifacts. Low lung volumes. The lungs  are grossly clear. No pneumothorax. Cardiomediastinal contours within  normal limits. Lower cervical spinal fusion hardware. No acute osseous  abnormality is identified.        Impression:       Mildly limited study demonstrating low lung volumes with no active  disease.     Electronically Signed ByAtif Cheema On:10/20/2019 1:23 PM  This report was finalized on [id number] by  Geovanny Cheema, .        reviewed    ECG/EMG Results (most recent)     Procedure Component Value Units Date/Time    ECG 12 Lead [[id number]] Collected:  10/20/19 1250     Updated:  10/20/19 1251    Narrative:       HEART RATE= 111  bpm  RR Interval= 540  ms  CO Interval= 141  ms  P Horizontal Axis= 3  deg  P Front Axis= 56  deg  QRSD Interval= 93  ms  QT Interval= 334  ms  QRS Axis= 15  deg  T Wave Axis= 21  deg  - BORDERLINE ECG -  Sinus tachycardia  Left atrial enlargement  Electronically Signed  By:   Date and Time of Study: 2019-10-20 12:50:21        reviewed              CT Chest Pulmonary Embolism  Narrative:    DATE OF EXAM:  10/20/2019 3:00 PM     PROCEDURE:  CT CHEST PULMONARY EMBOLISM-     INDICATIONS:   Stable chest pain. Short of breath.     COMPARISON:   Chest radiograph 10/20/2019.     TECHNIQUE:  Routine transaxial slices were obtained through chest after  administration of intravenous 68 ml of Isovue 370. Reconstructed coronal  and sagittal images were also obtained. Automated exposure control and  iterative reconstruction methods were used.      FINDINGS:  No evidence of a pulmonary arterial filling defect to suggest pulmonary  embolism. The heart and great vessels of the chest are normal in size.  No pericardial effusion. No pathologically enlarged intrathoracic lymph  nodes are identified. Heterogeneous enlarged thyroid gland.     Low lung volumes. Diffuse groundglass opacities throughout both lungs  that likely represent atelectasis. No focal lung consolidation. Central  airways are widely patent. No pneumothorax or pleural effusion.     Limited imaging of the upper abdomen partially includes nonspecific  thickening of the wall of the hepatic flexure.     Partially visualized lower cervical spinal fusion hardware. No acute  osseous abnormality or concerning osseous lesion.      Impression:    1. The study is negative for pulmonary embolism. No acute intrathoracic  abnormality.  2. Low lung volumes with diffuse groundglass opacities throughout both  lungs are likely represent atelectasis.  3. Partially included nonspecific thickening of the wall of the hepatic  flexure. Evaluate for abdominal pain. Could consider correlation with  colonoscopy if clinically indicated.     Electronically Signed By-Geovanny Cheema On:10/20/2019 3:30 PM  This report was finalized on [id number] by  Geovanny Cheema, .  XR Chest 1 View  Narrative: DATE OF EXAM:  10/20/2019 1:09 PM     PROCEDURE:  XR CHEST 1 VW-      INDICATIONS:  Chest pain protocol     COMPARISON:  None available.     TECHNIQUE:   Single radiographic AP view of the chest was obtained.     FINDINGS:  The study is mildly limited by lordotic patient positioning and the  patient's body habitus. Overlying artifacts. Low lung volumes. The lungs  are grossly clear. No pneumothorax. Cardiomediastinal contours within  normal limits. Lower cervical spinal fusion hardware. No acute osseous  abnormality is identified.      Impression: Mildly limited study demonstrating low lung volumes with no active  disease.     Electronically Signed By-Geovanny Cheema On:10/20/2019 1:23 PM  This report was finalized on [id number] by  Geovanny Cheema, .      Active Hospital Problems    Diagnosis  POA   • Chest pain [R07.9]  Yes      Resolved Hospital Problems   No resolved problems to display.         Assessment/Plan       Chest pain    Patient is a 44-year-old male with history of hypertension, diabetes and hyperlipidemia presenting with abdominal pain and chest pressure appearing hemodynamically stable    Abdominal pain -primary complaint per patient.  Patient reported diffuse abdominal pain along with diarrhea.  Also noted to have a white count.  Consider gastritis versus diverticulitis versus colitis versus pancreatitis.  -CT abdomen pelvis  -IV hydration  -GI cocktail  -IV Protonix  -Sucralfate  -Antiemetics  -Lactate    Chest pressure -patient reports pain when he takes a deep breath.  Heart score of 3 low risk.  Patient does have family history and multiple risk factors including hypertension, diabetes and hyperlipidemia along with obesity.  However patient's primary concerns more abdominal related  -Received full dose aspirin in the ED  -Can trend troponins x3  -Less likely cardiac at this time would hold on stress test will defer to primary team tomorrow  -Continue home statin    Leukocytosis -uncertain source.  No pulmonary findings  -Trend daily  -proCalcitonin  -Hold off on  antibiotics no source at this time    Hyponatremia-no signs of heart failure, may be ADH related stress response  -Trend daily    Anion gap metabolic acidosis -uncertain source at this time  -Check lactate    Polysubstance abuse -patient with prescribed benzos and opiates however U tox also positive for cannabis  -Recommend cessation    Hypertension - relatively controlled  -Resume home meds    Type 2 diabetes -uncertain degree of control at home  -A1c  -Sliding scale    Depression -patient on multiple psychiatric meds.  Patient very tearful and burst out crying multiple times during exam  -If persists recommend psych eval  -Resume home meds    Anxiety -she is on benzodiazepines, uncertain who is prescribing  -Resume for now    Chronic pain -on opiates for multiple years  -Resume while inpatient  -Ketamine as needed for pain    DVT prophylaxis -SCDs    Disposition    Observation, hopefully discharge in the next 24 to 48 hours    I discussed the patients findings and my recommendations with patient and family.     Ramón Benjamin MD  10/20/19  4:22 PM

## 2019-10-21 LAB
ADV 40+41 DNA STL QL NAA+NON-PROBE: NOT DETECTED
ALBUMIN SERPL-MCNC: 3.3 G/DL (ref 3.5–5.2)
ALBUMIN/GLOB SERPL: 1 G/DL
ALP SERPL-CCNC: 77 U/L (ref 39–117)
ALT SERPL W P-5'-P-CCNC: 9 U/L (ref 1–41)
ANION GAP SERPL CALCULATED.3IONS-SCNC: 11 MMOL/L (ref 5–15)
AST SERPL-CCNC: 10 U/L (ref 1–40)
ASTRO TYP 1-8 RNA STL QL NAA+NON-PROBE: NOT DETECTED
BASOPHILS # BLD AUTO: 0 10*3/MM3 (ref 0–0.2)
BASOPHILS NFR BLD AUTO: 0.3 % (ref 0–1.5)
BILIRUB SERPL-MCNC: <0.2 MG/DL (ref 0.2–1.2)
BUN BLD-MCNC: 7 MG/DL (ref 6–20)
BUN/CREAT SERPL: 9 (ref 7–25)
C CAYETANENSIS DNA STL QL NAA+NON-PROBE: NOT DETECTED
CALCIUM SPEC-SCNC: 8.9 MG/DL (ref 8.6–10.5)
CAMPY SP DNA.DIARRHEA STL QL NAA+PROBE: DETECTED
CHLORIDE SERPL-SCNC: 100 MMOL/L (ref 98–107)
CO2 SERPL-SCNC: 24 MMOL/L (ref 22–29)
CREAT BLD-MCNC: 0.78 MG/DL (ref 0.76–1.27)
CRYPTOSP STL CULT: NOT DETECTED
DEPRECATED RDW RBC AUTO: 42 FL (ref 37–54)
E COLI DNA SPEC QL NAA+PROBE: NOT DETECTED
E HISTOLYT AG STL-ACNC: NOT DETECTED
EAEC PAA PLAS AGGR+AATA ST NAA+NON-PRB: NOT DETECTED
EC STX1 + STX2 GENES STL NAA+PROBE: NOT DETECTED
EOSINOPHIL # BLD AUTO: 0 10*3/MM3 (ref 0–0.4)
EOSINOPHIL NFR BLD AUTO: 0.1 % (ref 0.3–6.2)
EPEC EAE GENE STL QL NAA+NON-PROBE: DETECTED
ERYTHROCYTE [DISTWIDTH] IN BLOOD BY AUTOMATED COUNT: 13.2 % (ref 12.3–15.4)
ETEC LTA+ST1A+ST1B TOX ST NAA+NON-PROBE: DETECTED
G LAMBLIA DNA SPEC QL NAA+PROBE: NOT DETECTED
GFR SERPL CREATININE-BSD FRML MDRD: 109 ML/MIN/1.73
GLOBULIN UR ELPH-MCNC: 3.3 GM/DL
GLUCOSE BLD-MCNC: 182 MG/DL (ref 65–99)
GLUCOSE BLDC GLUCOMTR-MCNC: 106 MG/DL (ref 70–105)
GLUCOSE BLDC GLUCOMTR-MCNC: 108 MG/DL (ref 70–105)
GLUCOSE BLDC GLUCOMTR-MCNC: 125 MG/DL (ref 70–105)
GLUCOSE BLDC GLUCOMTR-MCNC: 175 MG/DL (ref 70–105)
HBA1C MFR BLD: 6.1 % (ref 3.5–5.6)
HCT VFR BLD AUTO: 37.8 % (ref 37.5–51)
HGB BLD-MCNC: 12.6 G/DL (ref 13–17.7)
LYMPHOCYTES # BLD AUTO: 1.7 10*3/MM3 (ref 0.7–3.1)
LYMPHOCYTES NFR BLD AUTO: 17.7 % (ref 19.6–45.3)
MCH RBC QN AUTO: 29.9 PG (ref 26.6–33)
MCHC RBC AUTO-ENTMCNC: 33.3 G/DL (ref 31.5–35.7)
MCV RBC AUTO: 89.8 FL (ref 79–97)
MONOCYTES # BLD AUTO: 0.8 10*3/MM3 (ref 0.1–0.9)
MONOCYTES NFR BLD AUTO: 8.6 % (ref 5–12)
NEUTROPHILS # BLD AUTO: 7 10*3/MM3 (ref 1.7–7)
NEUTROPHILS NFR BLD AUTO: 73.3 % (ref 42.7–76)
NOROVIRUS GI+II RNA STL QL NAA+NON-PROBE: NOT DETECTED
NRBC BLD AUTO-RTO: 0.1 /100 WBC (ref 0–0.2)
P SHIGELLOIDES DNA STL QL NAA+PROBE: NOT DETECTED
PLATELET # BLD AUTO: 300 10*3/MM3 (ref 140–450)
PMV BLD AUTO: 7.6 FL (ref 6–12)
POTASSIUM BLD-SCNC: 3.5 MMOL/L (ref 3.5–5.2)
PROT SERPL-MCNC: 6.6 G/DL (ref 6–8.5)
RBC # BLD AUTO: 4.21 10*6/MM3 (ref 4.14–5.8)
RV RNA STL NAA+PROBE: NOT DETECTED
SALMONELLA DNA SPEC QL NAA+PROBE: NOT DETECTED
SAPO I+II+IV+V RNA STL QL NAA+NON-PROBE: NOT DETECTED
SHIGELLA SP+EIEC IPAH STL QL NAA+PROBE: NOT DETECTED
SODIUM BLD-SCNC: 135 MMOL/L (ref 136–145)
TROPONIN T SERPL-MCNC: <0.01 NG/ML (ref 0–0.03)
V CHOLERAE DNA SPEC QL NAA+PROBE: NOT DETECTED
VIBRIO DNA SPEC NAA+PROBE: NOT DETECTED
WBC NRBC COR # BLD: 9.5 10*3/MM3 (ref 3.4–10.8)
YERSINIA STL CULT: NOT DETECTED

## 2019-10-21 PROCEDURE — 25010000002 VANCOMYCIN 10 G RECONSTITUTED SOLUTION: Performed by: INTERNAL MEDICINE

## 2019-10-21 PROCEDURE — 25010000002 PIPERACILLIN SOD-TAZOBACTAM PER 1 G: Performed by: INTERNAL MEDICINE

## 2019-10-21 PROCEDURE — G0378 HOSPITAL OBSERVATION PER HR: HCPCS

## 2019-10-21 PROCEDURE — 85025 COMPLETE CBC W/AUTO DIFF WBC: CPT | Performed by: INTERNAL MEDICINE

## 2019-10-21 PROCEDURE — 63710000001 INSULIN LISPRO (HUMAN) PER 5 UNITS: Performed by: FAMILY MEDICINE

## 2019-10-21 PROCEDURE — 82962 GLUCOSE BLOOD TEST: CPT

## 2019-10-21 PROCEDURE — 84484 ASSAY OF TROPONIN QUANT: CPT | Performed by: INTERNAL MEDICINE

## 2019-10-21 PROCEDURE — 96361 HYDRATE IV INFUSION ADD-ON: CPT

## 2019-10-21 PROCEDURE — 96366 THER/PROPH/DIAG IV INF ADDON: CPT

## 2019-10-21 PROCEDURE — 96375 TX/PRO/DX INJ NEW DRUG ADDON: CPT

## 2019-10-21 PROCEDURE — 99226 PR SBSQ OBSERVATION CARE/DAY 35 MINUTES: CPT | Performed by: INTERNAL MEDICINE

## 2019-10-21 PROCEDURE — 96365 THER/PROPH/DIAG IV INF INIT: CPT

## 2019-10-21 PROCEDURE — 80053 COMPREHEN METABOLIC PANEL: CPT | Performed by: INTERNAL MEDICINE

## 2019-10-21 PROCEDURE — 0097U HC BIOFIRE FILMARRAY GI PANEL: CPT | Performed by: INTERNAL MEDICINE

## 2019-10-21 RX ORDER — FAMOTIDINE 20 MG/1
20 TABLET, FILM COATED ORAL
Status: DISCONTINUED | OUTPATIENT
Start: 2019-10-22 | End: 2019-10-23 | Stop reason: HOSPADM

## 2019-10-21 RX ADMIN — SUCRALFATE 1 G: 1 TABLET ORAL at 22:00

## 2019-10-21 RX ADMIN — ALPRAZOLAM 1 MG: 1 TABLET ORAL at 06:43

## 2019-10-21 RX ADMIN — ACETAMINOPHEN 650 MG: 325 TABLET ORAL at 07:15

## 2019-10-21 RX ADMIN — SODIUM CHLORIDE, SODIUM LACTATE, POTASSIUM CHLORIDE, AND CALCIUM CHLORIDE 125 ML/HR: 600; 310; 30; 20 INJECTION, SOLUTION INTRAVENOUS at 02:29

## 2019-10-21 RX ADMIN — SUCRALFATE 1 G: 1 TABLET ORAL at 11:09

## 2019-10-21 RX ADMIN — INSULIN LISPRO 2 UNITS: 100 INJECTION, SOLUTION INTRAVENOUS; SUBCUTANEOUS at 22:00

## 2019-10-21 RX ADMIN — VANCOMYCIN HYDROCHLORIDE 125 MG: 10 INJECTION, POWDER, LYOPHILIZED, FOR SOLUTION INTRAVENOUS at 19:00

## 2019-10-21 RX ADMIN — SUCRALFATE 1 G: 1 TABLET ORAL at 17:54

## 2019-10-21 RX ADMIN — PIPERACILLIN AND TAZOBACTAM 3.38 G: 3; .375 INJECTION, POWDER, LYOPHILIZED, FOR SOLUTION INTRAVENOUS at 10:11

## 2019-10-21 RX ADMIN — PANTOPRAZOLE SODIUM 40 MG: 40 INJECTION, POWDER, FOR SOLUTION INTRAVENOUS at 06:43

## 2019-10-21 RX ADMIN — QUETIAPINE 50 MG: 25 TABLET, FILM COATED ORAL at 22:00

## 2019-10-21 RX ADMIN — ATORVASTATIN CALCIUM 20 MG: 20 TABLET, FILM COATED ORAL at 08:23

## 2019-10-21 RX ADMIN — Medication 10 ML: at 22:00

## 2019-10-21 RX ADMIN — PIPERACILLIN AND TAZOBACTAM 3.38 G: 3; .375 INJECTION, POWDER, LYOPHILIZED, FOR SOLUTION INTRAVENOUS at 17:53

## 2019-10-21 RX ADMIN — DULOXETINE HYDROCHLORIDE 60 MG: 30 CAPSULE, DELAYED RELEASE ORAL at 08:23

## 2019-10-21 RX ADMIN — HYDROCODONE BITARTRATE AND ACETAMINOPHEN 1 TABLET: 7.5; 325 TABLET ORAL at 15:50

## 2019-10-21 RX ADMIN — ATENOLOL 25 MG: 25 TABLET ORAL at 08:24

## 2019-10-21 RX ADMIN — PAROXETINE HYDROCHLORIDE 60 MG: 20 TABLET, FILM COATED ORAL at 07:16

## 2019-10-21 RX ADMIN — HYDROCODONE BITARTRATE AND ACETAMINOPHEN 1 TABLET: 7.5; 325 TABLET ORAL at 02:35

## 2019-10-21 RX ADMIN — Medication 10 ML: at 08:24

## 2019-10-21 RX ADMIN — SUCRALFATE 1 G: 1 TABLET ORAL at 07:16

## 2019-10-21 RX ADMIN — VANCOMYCIN HYDROCHLORIDE 125 MG: 10 INJECTION, POWDER, LYOPHILIZED, FOR SOLUTION INTRAVENOUS at 12:03

## 2019-10-21 NOTE — PLAN OF CARE
Problem: Cardiac: ACS (Acute Coronary Syndrome) (Adult)  Goal: Signs and Symptoms of Listed Potential Problems Will be Absent, Minimized or Managed (Cardiac: ACS)  Outcome: Ongoing (interventions implemented as appropriate)   10/21/19 3816   Goal/Outcome Evaluation   Problems Assessed (Acute Coronary Syndrome) all   Problems Present (Acute Coronary Syn) none

## 2019-10-21 NOTE — PLAN OF CARE
Problem: Diarrhea (Adult)  Goal: Identify Related Risk Factors and Signs and Symptoms  Outcome: Ongoing (interventions implemented as appropriate)  Patient complains of diarrhea- stool is currently pending to test for C-Diff. Patient has been educated on hand washing. Patient still having abdominal pain. Tolerating clear liquid diet

## 2019-10-21 NOTE — PROGRESS NOTES
[facility] Medicine Services Daily Progress Note      Hospitalist Team  LOS 0 days      Patient Care Team:  Venancio Hurley MD as PCP - General        Chief Complaint / Subjective  Chief Complaint   Patient presents with   • Chest Pain       Brief Synopsis of Hospital Course/HPI    HISTORY OF PRESENT ILLNESS:     Patient is a 44-year-old male with history of depression, hypertension, diabetes presenting for evaluation of 1 day of chest pain.  Patient reports a great deal of emotional strain recently because he found out his 32-year-old sister  in her sleep suddenly currently unknown circumstances.  Patient reports upon getting out of bed this morning he has had a significant amount of abdominal pain, mostly periumbilical along with feelings of pressure in his abdomen and some along his chest.  Patient reports getting dizzy upon getting out of bed.  Patient now reports abdominal pain is primary symptom.  He denies any history of previous cardiac disease.  Diaphoresis noted earlier today.  No radiation of pain to jaw or down his arm.  Denies any constitutional infectious symptoms such as fevers, chills, fatigue.  No cough or sputum production.     Patient has first-generation family history of heart disease in his father who had his first cardiac intervention in his 50s for coronary artery disease.     In the emergency department patient is hemodynamically stable.  Labs significant for glucose 167, sodium of 132, potassium 4.1 however of note labs were hemolyzed uncertain of veracity.  White count 21.  Urine drug screen positive for opiates marijuana and benzodiazepines.  Initial troponins negative.  Emergency department concern for primary cardiac event.  EKG with significant artifact appears sinus no significant ST segment changes.  Patient had a CT of chest with contrast showing no PE or acute pulmonary findings.  Patient admitted for evaluation of abdominal pain.          ROS  All  "systems reviewed and negative except as stated below  10/1 2019  Complains of diarrhea about 6 times daily for the past 2 to 3 days.  Reports recent antibiotic use for ear infection and ruptured eardrum  No he denies any chest pain but has periumbilical abdominal pain.  CT scan reviewed.  Reports multiple family members with coronary artery disease and recent loss of his sister for unknown reason.    No family history on file.    Past Medical History:   Diagnosis Date   • Depression    • Diabetes mellitus (CMS/HCC)    • Hyperlipidemia    • Hypertension        Social History     Socioeconomic History   • Marital status: Single     Spouse name: Not on file   • Number of children: Not on file   • Years of education: Not on file   • Highest education level: Not on file   Tobacco Use   • Smoking status: Never Smoker   Substance and Sexual Activity   • Alcohol use: No     Frequency: Never   • Drug use: No   • Sexual activity: No           Objective      Vital Signs  Temp:  [97.8 °F (36.6 °C)-98.2 °F (36.8 °C)] 97.9 °F (36.6 °C)  Heart Rate:  [] 88  Resp:  [14-24] 17  BP: (105-151)/(70-83) 105/70  Oxygen Therapy  SpO2: 98 %  Pulse Oximetry Type: Intermittent  Device (Oxygen Therapy): room air  Flowsheet Rows      First Filed Value   Admission Height  180.3 cm (71\") Documented at 10/20/2019 1249   Admission Weight  99.8 kg (220 lb) Documented at 10/20/2019 1249        Intake & Output (last 3 days)       10/18 0701 - 10/19 0700 10/19 0701 - 10/20 0700 10/20 0701 - 10/21 0700 10/21 0701 - 10/22 0700    Urine (mL/kg/hr)   650     Stool   0     Total Output   650     Net   -650             Urine Unmeasured Occurrence   2 x 1 x    Stool Unmeasured Occurrence   1 x         Lines, Drains & Airways    Active LDAs     Name:   Placement date:   Placement time:   Site:   Days:    Peripheral IV 10/20/19 1253 Left Antecubital   10/20/19    1253    Antecubital   less than 1                  Physical Exam:    Physical Exam "   Constitutional: He is oriented to person, place, and time. He appears well-developed and well-nourished. No distress.   HENT:   Head: Normocephalic and atraumatic.   Right Ear: External ear normal.   Left Ear: External ear normal.   Nose: Nose normal.   Mouth/Throat: Oropharynx is clear and moist. No oropharyngeal exudate.   Eyes: Conjunctivae and EOM are normal. Pupils are equal, round, and reactive to light. Right eye exhibits no discharge. Left eye exhibits no discharge. No scleral icterus.   Neck: Normal range of motion. No JVD present. No tracheal deviation present. No thyromegaly present.   Cardiovascular: Normal rate, regular rhythm, normal heart sounds and intact distal pulses. Exam reveals no gallop and no friction rub.   No murmur heard.  Pulmonary/Chest: Effort normal and breath sounds normal. No stridor. No respiratory distress. He has no wheezes. He has no rales. He exhibits no tenderness.   Abdominal: Soft. Bowel sounds are normal. He exhibits no distension and no mass. There is no tenderness. There is no rebound and no guarding. No hernia.   Mild diffuse tenderness.  No rebound or rigidity.  Bowel sounds +2.   Musculoskeletal: Normal range of motion. He exhibits no edema, tenderness or deformity.   Lymphadenopathy:     He has no cervical adenopathy.   Neurological: He is alert and oriented to person, place, and time. No cranial nerve deficit or sensory deficit. He exhibits normal muscle tone. Coordination normal.   Patient reports short-term memory impairment secondary to his loss of his sister recently.  He has multiple family members with coronary artery disease     Skin: Skin is warm and dry. No rash noted. He is not diaphoretic. No erythema.   Psychiatric: He has a normal mood and affect. His behavior is normal.   Nursing note and vitals reviewed.        Procedures:              Results Review:     I reviewed the patient's new clinical results.      Results from last 7 days   Lab Units  10/20/19  1254   WBC 10*3/mm3 21.00*   HEMOGLOBIN g/dL 14.2   HEMATOCRIT % 42.6   PLATELETS 10*3/mm3 362     Results from last 7 days   Lab Units 10/20/19  1254   SODIUM mmol/L 132*   POTASSIUM mmol/L 4.1   CHLORIDE mmol/L 94*   CO2 mmol/L 18.0*   BUN mg/dL 8   CREATININE mg/dL 0.98   CALCIUM mg/dL 9.6   BILIRUBIN mg/dL 0.5   ALK PHOS U/L 96   ALT (SGPT) U/L 12   AST (SGOT) U/L 16   GLUCOSE mg/dL 167*         Lab Results   Component Value Date    CALCIUM 9.6 10/20/2019     No results found for: HGBA1C                Microbiology Results (last 10 days)     ** No results found for the last 240 hours. **          ECG/EMG Results (most recent)     Procedure Component Value Units Date/Time    ECG 12 Lead [[id number]] Collected:  10/20/19 1250     Updated:  10/21/19 0644    Narrative:       HEART RATE= 107  bpm  RR Interval= 560  ms  TX Interval= 150  ms  P Horizontal Axis= -3  deg  P Front Axis= 50  deg  QRSD Interval= 94  ms  QT Interval= 337  ms  QRS Axis= 9  deg  T Wave Axis= 27  deg  - BORDERLINE ECG -  Sinus tachycardia  Probable left atrial enlargement  Electronically Signed By: Mahamed Cardenas ([facility]) 21-Oct-2019 06:44:15  Date and Time of Study: 2019-10-20 12:52:00                    Ct Abdomen Pelvis Without Contrast    Result Date: 10/20/2019  Moderate diffuse cervical vertebral region of the wall of the cecum, ascending colon, and transverse colon with mild pericolonic fat stranding, indicating a nonspecific colitis, likely infectious/inflammatory. Ischemic colitis is not excluded but felt less likely given the distribution.  Electronically Signed ByAtif Cheema On:10/20/2019 6:37 PM This report was finalized on [id number] by  Nicolle Wilde    Xr Chest 1 View    Result Date: 10/20/2019  Mildly limited study demonstrating low lung volumes with no active disease.  Electronically Signed ByAtif Cheema On:10/20/2019 1:23 PM This report was finalized on [id number] by  Nicolle Wilde    Ct Chest Pulmonary  Embolism    Result Date: 10/20/2019   1. The study is negative for pulmonary embolism. No acute intrathoracic abnormality. 2. Low lung volumes with diffuse groundglass opacities throughout both lungs are likely represent atelectasis. 3. Partially included nonspecific thickening of the wall of the hepatic flexure. Evaluate for abdominal pain. Could consider correlation with colonoscopy if clinically indicated.  Electronically Signed By-Geovanny Cheema On:10/20/2019 3:30 PM This report was finalized on [id number] by  Geovanny Cheema, .      Xrays, labs reviewed personally by physician.    Medication Review:   I have reviewed the patient's current medication list      Scheduled Meds    atenolol 25 mg Oral Daily   atorvastatin 20 mg Oral Daily   DULoxetine 60 mg Oral Daily   insulin lispro 0-9 Units Subcutaneous 4x Daily With Meals & Nightly   pantoprazole 40 mg Intravenous Q AM   PARoxetine 60 mg Oral QAM   QUEtiapine 50 mg Oral BID   sodium chloride 10 mL Intravenous Q12H   sucralfate 1 g Oral 4x Daily AC & at Bedtime   vancomycin 125 mg Oral Q6H       Meds Infusions    lactated ringers 125 mL/hr Last Rate: 125 mL/hr (10/21/19 0854)   Pharmacy Consult     Pharmacy to Dose Zosyn         Meds PRN  •  acetaminophen **OR** acetaminophen **OR** acetaminophen  •  ALPRAZolam  •  calcium carbonate  •  cyclobenzaprine  •  dextrose  •  dextrose  •  glucagon (human recombinant)  •  HYDROcodone-acetaminophen  •  ketamine (KETALAR) infusion **AND** Ketamine Vital Signs & Assessment  •  ketorolac  •  magnesium sulfate **OR** magnesium sulfate **OR** magnesium sulfate  •  melatonin  •  ondansetron **OR** ondansetron  •  Pharmacy Consult  •  Pharmacy to Dose Zosyn  •  potassium chloride  •  potassium chloride  •  sodium chloride  •  sodium chloride      Assessment / Plan    Active Hospital Problems    Diagnosis  POA   • Chest pain [R07.9]  Yes      Resolved Hospital Problems   No resolved problems to display.       Patient is a  44-year-old male with history of hypertension, diabetes and hyperlipidemia presenting with abdominal pain and chest pressure appearing hemodynamically stable     Acute colitis likely infectious.  Rule out C. difficile given recent antibiotic use.  Check C. difficile  Start p.o. Vanco and IV Zosyn  Patient reported diffuse abdominal pain along with diarrhea.  Also noted to have a white count.  Consider gastritis versus diverticulitis versus colitis versus pancreatitis.  -CT abdomen pelvis  -IV hydration  -GI cocktail  -IV Protonix  -Sucralfate  -Antiemetics  -Lactate     Chest pressure -patient reports pain when he takes a deep breath.  Heart score of 3 low risk.  Patient does have family history and multiple risk factors including hypertension, diabetes and hyperlipidemia along with obesity.  However patient's primary concerns more abdominal related  -Received full dose aspirin in the ED  -Initial troponin negative.  Repeat evaluation.  -Continue home statin  Patient has significant family history of coronary artery disease multiple family members.  Will check stress test       Hyponatremia-no signs of heart failure, may be ADH related stress response  -Trend daily     Polysubstance abuse -patient with prescribed benzos and opiates however U tox also positive for cannabis  -Recommend cessation     Hypertension - relatively controlled  Hold blood pressure medications given borderline low blood pressure.  Patient also has diarrhea and fluid losses.  May restart soon.     Type 2 diabetes -uncertain degree of control at home  -A1c  -Sliding scale     Depression -patient on multiple psychiatric meds.  Patient very tearful and burst out crying multiple times during exam  -If persists recommend psych eval  -Resume home meds     Anxiety -she is on benzodiazepines, uncertain who is prescribing  -Resume for now     Chronic pain -on opiates for multiple years  -Resume while inpatient  -Ketamine as needed for pain     DVT  prophylaxis -SCDs              Discharge Planning    Destination      No service coordination in this encounter.      Durable Medical Equipment      No service coordination in this encounter.      Dialysis/Infusion      No service coordination in this encounter.      Home Medical Care      No service coordination in this encounter.      Therapy      No service coordination in this encounter.      Community Resources      No service coordination in this encounter.          Som Weaver MD  10/21/19  9:32 AM

## 2019-10-21 NOTE — PROGRESS NOTES
Discharge Planning Assessment  [facility]     Patient Name: Gurmeet Garcia  MRN: [id number]  Today's Date: 10/21/2019    Admit Date: 10/20/2019    Discharge Needs Assessment     Row Name 10/21/19 1417       Living Environment    Lives With  alone    Current Living Arrangements  home/apartment/condo    Primary Care Provided by  self    Provides Primary Care For  no one    Able to Return to Prior Arrangements  yes       Resource/Environmental Concerns    Resource/Environmental Concerns  none    Transportation Concerns  car, none       Transition Planning    Patient/Family Anticipates Transition to  home    Patient/Family Anticipated Services at Transition  none    Transportation Anticipated  car, drives self;family or friend will provide States he usually drives himself but did not drive himself to hospital. States he will have a ride home.        Discharge Needs Assessment    Readmission Within the Last 30 Days  no previous admission in last 30 days    Concerns to be Addressed  no discharge needs identified;denies needs/concerns at this time    Equipment Currently Used at Home  bipap/cpap Cpap through [employer]    Anticipated Changes Related to Illness  none    Equipment Needed After Discharge  none    Discharge Coordination/Progress  Patient states no trouble affording medications, PCP Venancio Hurley.        Discharge Plan     Row Name 10/21/19 1416       Plan    Plan  Anticipate routine home, denies needs at this time.    Patient/Family in Agreement with Plan  yes    Plan Comments  DC Barriers: Stress test scheduled for tomorrow.        Claire Rasheed RN       Office Phone: [phone]  Office Cell: [phone]

## 2019-10-21 NOTE — PLAN OF CARE
Problem: Patient Care Overview  Goal: Plan of Care Review  Outcome: Ongoing (interventions implemented as appropriate)   10/21/19 8472   Coping/Psychosocial   Plan of Care Reviewed With patient   Plan of Care Review   Progress no change

## 2019-10-21 NOTE — PLAN OF CARE
Problem: Diarrhea (Adult)  Goal: Improved/Reduced Symptoms  Outcome: Ongoing (interventions implemented as appropriate)   10/21/19 1427   Diarrhea (Adult)   Improved/Reduced Symptoms making progress toward outcome

## 2019-10-22 ENCOUNTER — APPOINTMENT (OUTPATIENT)
Dept: NUCLEAR MEDICINE | Facility: HOSPITAL | Age: 44
End: 2019-10-22

## 2019-10-22 LAB
ALBUMIN SERPL-MCNC: 3.4 G/DL (ref 3.5–5.2)
ALBUMIN/GLOB SERPL: 1 G/DL
ALP SERPL-CCNC: 77 U/L (ref 39–117)
ALT SERPL W P-5'-P-CCNC: 9 U/L (ref 1–41)
ANION GAP SERPL CALCULATED.3IONS-SCNC: 10 MMOL/L (ref 5–15)
AST SERPL-CCNC: 12 U/L (ref 1–40)
BASOPHILS # BLD AUTO: 0 10*3/MM3 (ref 0–0.2)
BASOPHILS NFR BLD AUTO: 0.4 % (ref 0–1.5)
BH CV NUCLEAR PRIOR STUDY: 3
BH CV STRESS BP STAGE 1: NORMAL
BH CV STRESS BP STAGE 2: NORMAL
BH CV STRESS BP STAGE 3: NORMAL
BH CV STRESS DURATION MIN STAGE 1: 3
BH CV STRESS DURATION MIN STAGE 2: 3
BH CV STRESS DURATION MIN STAGE 3: 3
BH CV STRESS DURATION SEC STAGE 1: 0
BH CV STRESS DURATION SEC STAGE 2: 0
BH CV STRESS DURATION SEC STAGE 3: 0
BH CV STRESS GRADE STAGE 1: 10
BH CV STRESS GRADE STAGE 2: 12
BH CV STRESS GRADE STAGE 3: 14
BH CV STRESS HR STAGE 1: 123
BH CV STRESS HR STAGE 2: 144
BH CV STRESS HR STAGE 3: 153
BH CV STRESS METS STAGE 1: 5
BH CV STRESS METS STAGE 2: 7.5
BH CV STRESS METS STAGE 3: 10
BH CV STRESS PROTOCOL 1: NORMAL
BH CV STRESS RECOVERY BP: NORMAL MMHG
BH CV STRESS RECOVERY HR: 117 BPM
BH CV STRESS SPEED STAGE 1: 1.7
BH CV STRESS SPEED STAGE 2: 2.5
BH CV STRESS SPEED STAGE 3: 3.4
BH CV STRESS STAGE 1: 1
BH CV STRESS STAGE 2: 2
BH CV STRESS STAGE 3: 3
BILIRUB SERPL-MCNC: 0.2 MG/DL (ref 0.2–1.2)
BUN BLD-MCNC: 6 MG/DL (ref 6–20)
BUN/CREAT SERPL: 7.3 (ref 7–25)
CALCIUM SPEC-SCNC: 9.2 MG/DL (ref 8.6–10.5)
CHLORIDE SERPL-SCNC: 102 MMOL/L (ref 98–107)
CO2 SERPL-SCNC: 26 MMOL/L (ref 22–29)
CREAT BLD-MCNC: 0.82 MG/DL (ref 0.76–1.27)
DEPRECATED RDW RBC AUTO: 40.7 FL (ref 37–54)
EOSINOPHIL # BLD AUTO: 0.1 10*3/MM3 (ref 0–0.4)
EOSINOPHIL NFR BLD AUTO: 1.1 % (ref 0.3–6.2)
ERYTHROCYTE [DISTWIDTH] IN BLOOD BY AUTOMATED COUNT: 13 % (ref 12.3–15.4)
GFR SERPL CREATININE-BSD FRML MDRD: 103 ML/MIN/1.73
GLOBULIN UR ELPH-MCNC: 3.4 GM/DL
GLUCOSE BLD-MCNC: 140 MG/DL (ref 65–99)
GLUCOSE BLDC GLUCOMTR-MCNC: 132 MG/DL (ref 70–105)
GLUCOSE BLDC GLUCOMTR-MCNC: 159 MG/DL (ref 70–105)
GLUCOSE BLDC GLUCOMTR-MCNC: 161 MG/DL (ref 70–105)
GLUCOSE BLDC GLUCOMTR-MCNC: 87 MG/DL (ref 70–105)
HCT VFR BLD AUTO: 36.3 % (ref 37.5–51)
HGB BLD-MCNC: 12.4 G/DL (ref 13–17.7)
LV EF NUC BP: 58 %
LYMPHOCYTES # BLD AUTO: 2.1 10*3/MM3 (ref 0.7–3.1)
LYMPHOCYTES NFR BLD AUTO: 30.7 % (ref 19.6–45.3)
MAXIMAL PREDICTED HEART RATE: 178 BPM
MCH RBC QN AUTO: 30.2 PG (ref 26.6–33)
MCHC RBC AUTO-ENTMCNC: 34 G/DL (ref 31.5–35.7)
MCV RBC AUTO: 88.8 FL (ref 79–97)
MONOCYTES # BLD AUTO: 0.9 10*3/MM3 (ref 0.1–0.9)
MONOCYTES NFR BLD AUTO: 12.7 % (ref 5–12)
NEUTROPHILS # BLD AUTO: 3.7 10*3/MM3 (ref 1.7–7)
NEUTROPHILS NFR BLD AUTO: 55.1 % (ref 42.7–76)
NRBC BLD AUTO-RTO: 0.1 /100 WBC (ref 0–0.2)
PERCENT MAX PREDICTED HR: 85.96 %
PLATELET # BLD AUTO: 294 10*3/MM3 (ref 140–450)
PMV BLD AUTO: 6.9 FL (ref 6–12)
POTASSIUM BLD-SCNC: 3.7 MMOL/L (ref 3.5–5.2)
PROT SERPL-MCNC: 6.8 G/DL (ref 6–8.5)
RBC # BLD AUTO: 4.09 10*6/MM3 (ref 4.14–5.8)
SODIUM BLD-SCNC: 138 MMOL/L (ref 136–145)
STRESS BASELINE BP: NORMAL MMHG
STRESS BASELINE HR: 110 BPM
STRESS PERCENT HR: 101 %
STRESS POST ESTIMATED WORKLOAD: 7 METS
STRESS POST EXERCISE DUR MIN: 6 MIN
STRESS POST EXERCISE DUR SEC: 34 SEC
STRESS POST PEAK BP: NORMAL MMHG
STRESS POST PEAK HR: 153 BPM
STRESS TARGET HR: 151 BPM
WBC NRBC COR # BLD: 6.7 10*3/MM3 (ref 3.4–10.8)

## 2019-10-22 PROCEDURE — 25010000002 VANCOMYCIN 10 G RECONSTITUTED SOLUTION: Performed by: INTERNAL MEDICINE

## 2019-10-22 PROCEDURE — 25010000002 PIPERACILLIN SOD-TAZOBACTAM PER 1 G: Performed by: INTERNAL MEDICINE

## 2019-10-22 PROCEDURE — 87449 NOS EACH ORGANISM AG IA: CPT | Performed by: INTERNAL MEDICINE

## 2019-10-22 PROCEDURE — 87324 CLOSTRIDIUM AG IA: CPT | Performed by: INTERNAL MEDICINE

## 2019-10-22 PROCEDURE — 78452 HT MUSCLE IMAGE SPECT MULT: CPT

## 2019-10-22 PROCEDURE — 82962 GLUCOSE BLOOD TEST: CPT

## 2019-10-22 PROCEDURE — 93016 CV STRESS TEST SUPVJ ONLY: CPT | Performed by: NURSE PRACTITIONER

## 2019-10-22 PROCEDURE — 96366 THER/PROPH/DIAG IV INF ADDON: CPT

## 2019-10-22 PROCEDURE — 80053 COMPREHEN METABOLIC PANEL: CPT | Performed by: INTERNAL MEDICINE

## 2019-10-22 PROCEDURE — 99226 PR SBSQ OBSERVATION CARE/DAY 35 MINUTES: CPT | Performed by: INTERNAL MEDICINE

## 2019-10-22 PROCEDURE — 85025 COMPLETE CBC W/AUTO DIFF WBC: CPT | Performed by: INTERNAL MEDICINE

## 2019-10-22 PROCEDURE — A9500 TC99M SESTAMIBI: HCPCS | Performed by: INTERNAL MEDICINE

## 2019-10-22 PROCEDURE — 93017 CV STRESS TEST TRACING ONLY: CPT

## 2019-10-22 PROCEDURE — 63710000001 INSULIN LISPRO (HUMAN) PER 5 UNITS: Performed by: FAMILY MEDICINE

## 2019-10-22 PROCEDURE — G0378 HOSPITAL OBSERVATION PER HR: HCPCS

## 2019-10-22 PROCEDURE — 93018 CV STRESS TEST I&R ONLY: CPT | Performed by: INTERNAL MEDICINE

## 2019-10-22 PROCEDURE — 0 TECHNETIUM SESTAMIBI: Performed by: INTERNAL MEDICINE

## 2019-10-22 PROCEDURE — 78452 HT MUSCLE IMAGE SPECT MULT: CPT | Performed by: INTERNAL MEDICINE

## 2019-10-22 RX ORDER — AMLODIPINE BESYLATE 5 MG/1
5 TABLET ORAL
Status: DISCONTINUED | OUTPATIENT
Start: 2019-10-23 | End: 2019-10-23 | Stop reason: HOSPADM

## 2019-10-22 RX ORDER — HYDRALAZINE HYDROCHLORIDE 25 MG/1
25 TABLET, FILM COATED ORAL DAILY
Status: DISCONTINUED | OUTPATIENT
Start: 2019-10-23 | End: 2019-10-23 | Stop reason: HOSPADM

## 2019-10-22 RX ORDER — LABETALOL HYDROCHLORIDE 5 MG/ML
10 INJECTION, SOLUTION INTRAVENOUS EVERY 6 HOURS PRN
Status: DISCONTINUED | OUTPATIENT
Start: 2019-10-22 | End: 2019-10-23 | Stop reason: HOSPADM

## 2019-10-22 RX ORDER — CETIRIZINE HYDROCHLORIDE 10 MG/1
10 TABLET ORAL DAILY
Status: DISCONTINUED | OUTPATIENT
Start: 2019-10-23 | End: 2019-10-23 | Stop reason: HOSPADM

## 2019-10-22 RX ORDER — AZITHROMYCIN 250 MG/1
500 TABLET, FILM COATED ORAL
Status: DISCONTINUED | OUTPATIENT
Start: 2019-10-22 | End: 2019-10-23 | Stop reason: HOSPADM

## 2019-10-22 RX ORDER — AMLODIPINE BESYLATE 5 MG/1
5 TABLET ORAL ONCE
Status: COMPLETED | OUTPATIENT
Start: 2019-10-22 | End: 2019-10-22

## 2019-10-22 RX ORDER — HYDRALAZINE HYDROCHLORIDE 25 MG/1
25 TABLET, FILM COATED ORAL ONCE
Status: COMPLETED | OUTPATIENT
Start: 2019-10-22 | End: 2019-10-22

## 2019-10-22 RX ADMIN — INSULIN LISPRO 2 UNITS: 100 INJECTION, SOLUTION INTRAVENOUS; SUBCUTANEOUS at 09:24

## 2019-10-22 RX ADMIN — VANCOMYCIN HYDROCHLORIDE 125 MG: 10 INJECTION, POWDER, LYOPHILIZED, FOR SOLUTION INTRAVENOUS at 16:18

## 2019-10-22 RX ADMIN — FAMOTIDINE 20 MG: 20 TABLET ORAL at 16:15

## 2019-10-22 RX ADMIN — ALPRAZOLAM 1 MG: 1 TABLET ORAL at 05:29

## 2019-10-22 RX ADMIN — PIPERACILLIN AND TAZOBACTAM 3.38 G: 3; .375 INJECTION, POWDER, LYOPHILIZED, FOR SOLUTION INTRAVENOUS at 01:09

## 2019-10-22 RX ADMIN — ALPRAZOLAM 1 MG: 1 TABLET ORAL at 16:15

## 2019-10-22 RX ADMIN — VANCOMYCIN HYDROCHLORIDE 125 MG: 10 INJECTION, POWDER, LYOPHILIZED, FOR SOLUTION INTRAVENOUS at 05:28

## 2019-10-22 RX ADMIN — Medication 10 ML: at 21:53

## 2019-10-22 RX ADMIN — TECHNETIUM TC 99M SESTAMIBI 1 DOSE: 1 INJECTION INTRAVENOUS at 11:15

## 2019-10-22 RX ADMIN — HYDRALAZINE HYDROCHLORIDE 25 MG: 25 TABLET, FILM COATED ORAL at 21:53

## 2019-10-22 RX ADMIN — PAROXETINE HYDROCHLORIDE 60 MG: 20 TABLET, FILM COATED ORAL at 05:38

## 2019-10-22 RX ADMIN — SUCRALFATE 1 G: 1 TABLET ORAL at 16:15

## 2019-10-22 RX ADMIN — HYDROCODONE BITARTRATE AND ACETAMINOPHEN 1 TABLET: 7.5; 325 TABLET ORAL at 05:28

## 2019-10-22 RX ADMIN — ATENOLOL 25 MG: 25 TABLET ORAL at 16:14

## 2019-10-22 RX ADMIN — ATORVASTATIN CALCIUM 20 MG: 20 TABLET, FILM COATED ORAL at 16:15

## 2019-10-22 RX ADMIN — PIPERACILLIN AND TAZOBACTAM 3.38 G: 3; .375 INJECTION, POWDER, LYOPHILIZED, FOR SOLUTION INTRAVENOUS at 09:23

## 2019-10-22 RX ADMIN — AMLODIPINE BESYLATE 5 MG: 5 TABLET ORAL at 21:53

## 2019-10-22 RX ADMIN — VANCOMYCIN HYDROCHLORIDE 125 MG: 10 INJECTION, POWDER, LYOPHILIZED, FOR SOLUTION INTRAVENOUS at 21:52

## 2019-10-22 RX ADMIN — QUETIAPINE 50 MG: 25 TABLET, FILM COATED ORAL at 21:52

## 2019-10-22 RX ADMIN — Medication 10 ML: at 09:25

## 2019-10-22 RX ADMIN — AZITHROMYCIN 500 MG: 250 TABLET, FILM COATED ORAL at 18:03

## 2019-10-22 RX ADMIN — VANCOMYCIN HYDROCHLORIDE 125 MG: 10 INJECTION, POWDER, LYOPHILIZED, FOR SOLUTION INTRAVENOUS at 01:09

## 2019-10-22 RX ADMIN — DULOXETINE HYDROCHLORIDE 60 MG: 30 CAPSULE, DELAYED RELEASE ORAL at 16:15

## 2019-10-22 RX ADMIN — HYDROCODONE BITARTRATE AND ACETAMINOPHEN 1 TABLET: 7.5; 325 TABLET ORAL at 16:14

## 2019-10-22 RX ADMIN — TECHNETIUM TC 99M SESTAMIBI 1 DOSE: 1 INJECTION INTRAVENOUS at 13:00

## 2019-10-22 RX ADMIN — SUCRALFATE 1 G: 1 TABLET ORAL at 21:53

## 2019-10-22 RX ADMIN — INSULIN LISPRO 2 UNITS: 100 INJECTION, SOLUTION INTRAVENOUS; SUBCUTANEOUS at 18:03

## 2019-10-22 RX ADMIN — PAROXETINE HYDROCHLORIDE 60 MG: 20 TABLET, FILM COATED ORAL at 16:31

## 2019-10-22 NOTE — PROGRESS NOTES
[facility] Medicine Services Daily Progress Note      Hospitalist Team  LOS 0 days      Patient Care Team:  Venancio Hurley MD as PCP - General        Chief Complaint / Subjective  Chief Complaint   Patient presents with   • Chest Pain       Brief Synopsis of Hospital Course/HPI    HISTORY OF PRESENT ILLNESS:     Patient is a 44-year-old male with history of depression, hypertension, diabetes presenting for evaluation of 1 day of chest pain.  Patient reports a great deal of emotional strain recently because he found out his 32-year-old sister  in her sleep suddenly currently unknown circumstances.  Patient reports upon getting out of bed this morning he has had a significant amount of abdominal pain, mostly periumbilical along with feelings of pressure in his abdomen and some along his chest.  Patient reports getting dizzy upon getting out of bed.  Patient now reports abdominal pain is primary symptom.  He denies any history of previous cardiac disease.  Diaphoresis noted earlier today.  No radiation of pain to jaw or down his arm.  Denies any constitutional infectious symptoms such as fevers, chills, fatigue.  No cough or sputum production.     Patient has first-generation family history of heart disease in his father who had his first cardiac intervention in his 50s for coronary artery disease.     In the emergency department patient is hemodynamically stable.  Labs significant for glucose 167, sodium of 132, potassium 4.1 however of note labs were hemolyzed uncertain of veracity.  White count 21.  Urine drug screen positive for opiates marijuana and benzodiazepines.  Initial troponins negative.  Emergency department concern for primary cardiac event.  EKG with significant artifact appears sinus no significant ST segment changes.  Patient had a CT of chest with contrast showing no PE or acute pulmonary findings.  Patient admitted for evaluation of abdominal pain.          ROS  All  "systems reviewed and negative except as stated below  10/ 21/2019 019  Complains of diarrhea about 6 times daily for the past 2 to 3 days.  Reports recent antibiotic use for ear infection and ruptured eardrum  No he denies any chest pain but has periumbilical abdominal pain.  CT scan reviewed.  Reports multiple family members with coronary artery disease and recent loss of his sister for unknown reason.    10/22/2019  Chief complaint of diarrhea about 10 times this morning.  Denies any bleeding.  Had stress test this morning and complained of postnasal discharge and runny nose.  We will add antihistamines and switch to Zithromax.  Pending C. difficile      No family history on file.    Past Medical History:   Diagnosis Date   • Depression    • Diabetes mellitus (CMS/HCC)    • Hyperlipidemia    • Hypertension        Social History     Socioeconomic History   • Marital status: Single     Spouse name: Not on file   • Number of children: Not on file   • Years of education: Not on file   • Highest education level: Not on file   Tobacco Use   • Smoking status: Never Smoker   Substance and Sexual Activity   • Alcohol use: No     Frequency: Never   • Drug use: No   • Sexual activity: No           Objective      Vital Signs  Temp:  [98 °F (36.7 °C)-98.6 °F (37 °C)] 98.6 °F (37 °C)  Heart Rate:  [] 101  Resp:  [12-18] 12  BP: (125-139)/(82-88) 139/88  Oxygen Therapy  SpO2: 100 %  Pulse Oximetry Type: Intermittent  Device (Oxygen Therapy): room air  Flowsheet Rows      First Filed Value   Admission Height  180.3 cm (71\") Documented at 10/20/2019 1249   Admission Weight  99.8 kg (220 lb) Documented at 10/20/2019 1249        Intake & Output (last 3 days)       10/19 0701 - 10/20 0700 10/20 0701 - 10/21 0700 10/21 0701 - 10/22 0700 10/22 0701 - 10/23 0700    P.O.   720     IV Piggyback   200     Total Intake(mL/kg)   920 (8.1)     Urine (mL/kg/hr)  650      Stool  0      Total Output  650      Net  -650 +920             " Urine Unmeasured Occurrence  2 x 1 x     Stool Unmeasured Occurrence  1 x 6 x         Lines, Drains & Airways    Active LDAs     Name:   Placement date:   Placement time:   Site:   Days:    Peripheral IV 10/20/19 1253 Left Antecubital   10/20/19    1253    Antecubital   less than 1                  Physical Exam:    Physical Exam   Constitutional: He is oriented to person, place, and time. He appears well-developed and well-nourished. No distress.   HENT:   Head: Normocephalic and atraumatic.   Right Ear: External ear normal.   Left Ear: External ear normal.   Nose: Nose normal.   Mouth/Throat: Oropharynx is clear and moist. No oropharyngeal exudate.   Eyes: Conjunctivae and EOM are normal. Pupils are equal, round, and reactive to light. Right eye exhibits no discharge. Left eye exhibits no discharge. No scleral icterus.   Neck: Normal range of motion. No JVD present. No tracheal deviation present. No thyromegaly present.   Cardiovascular: Normal rate, regular rhythm, normal heart sounds and intact distal pulses. Exam reveals no gallop and no friction rub.   No murmur heard.  Pulmonary/Chest: Effort normal and breath sounds normal. No stridor. No respiratory distress. He has no wheezes. He has no rales. He exhibits no tenderness.   Abdominal: Soft. Bowel sounds are normal. He exhibits no distension and no mass. There is no tenderness. There is no rebound and no guarding. No hernia.   Mild diffuse tenderness.  No rebound or rigidity.  Bowel sounds +2.   Musculoskeletal: Normal range of motion. He exhibits no edema, tenderness or deformity.   Lymphadenopathy:     He has no cervical adenopathy.   Neurological: He is alert and oriented to person, place, and time. No cranial nerve deficit or sensory deficit. He exhibits normal muscle tone. Coordination normal.   Patient reports short-term memory impairment secondary to his loss of his sister recently.  He has multiple family members with coronary artery disease     Skin:  Skin is warm and dry. No rash noted. He is not diaphoretic. No erythema.   Psychiatric: He has a normal mood and affect. His behavior is normal.   Nursing note and vitals reviewed.        Procedures:              Results Review:     I reviewed the patient's new clinical results.      Results from last 7 days   Lab Units 10/22/19  0514 10/21/19  1009 10/20/19  1254   WBC 10*3/mm3 6.70 9.50 21.00*   HEMOGLOBIN g/dL 12.4* 12.6* 14.2   HEMATOCRIT % 36.3* 37.8 42.6   PLATELETS 10*3/mm3 294 300 362     Results from last 7 days   Lab Units 10/22/19  0419 10/21/19  1008 10/20/19  1254   SODIUM mmol/L 138 135* 132*   POTASSIUM mmol/L 3.7 3.5 4.1   CHLORIDE mmol/L 102 100 94*   CO2 mmol/L 26.0 24.0 18.0*   BUN mg/dL 6 7 8   CREATININE mg/dL 0.82 0.78 0.98   CALCIUM mg/dL 9.2 8.9 9.6   BILIRUBIN mg/dL 0.2 <0.2* 0.5   ALK PHOS U/L 77 77 96   ALT (SGPT) U/L 9 9 12   AST (SGOT) U/L 12 10 16   GLUCOSE mg/dL 140* 182* 167*         Lab Results   Component Value Date    CALCIUM 9.2 10/22/2019     Hemoglobin A1C   Date Value Ref Range Status   10/20/2019 6.1 (H) 3.5 - 5.6 % Final                   Microbiology Results (last 10 days)     Procedure Component Value - Date/Time    Gastrointestinal Panel, PCR - Stool, Per Rectum [[id number]]  (Abnormal) Collected:  10/21/19 1039    Lab Status:  Final result Specimen:  Stool from Per Rectum Updated:  10/21/19 0807     Campylobacter Detected     Plesiomonas shigelloides Not Detected     Salmonella Not Detected     Vibrio Not Detected     Vibrio cholerae Not Detected     Yersinia enterocolitica Not Detected     Enteroaggregative E. coli (EAEC) Not Detected     Enteropathogenic E. coli (EPEC) Detected     Enterotoxigenic E. coli (ETEC) lt/st Detected     Shiga-like toxin-producing E. coli (STEC) stx1/stx2 Not Detected     E. coli O157 Not Detected     Shigella/Enteroinvasive E. coli (EIEC) Not Detected     Cryptosporidium Not Detected     Cyclospora cayetanensis Not Detected     Entamoeba  histolytica Not Detected     Giardia lamblia Not Detected     Adenovirus F40/41 Not Detected     Astrovirus Not Detected     Norovirus GI/GII Not Detected     Rotavirus A Not Detected     Sapovirus (I, II, IV or V) Not Detected          ECG/EMG Results (most recent)     Procedure Component Value Units Date/Time    ECG 12 Lead [[id number]] Collected:  10/20/19 1250     Updated:  10/21/19 0644    Narrative:       HEART RATE= 107  bpm  RR Interval= 560  ms  CA Interval= 150  ms  P Horizontal Axis= -3  deg  P Front Axis= 50  deg  QRSD Interval= 94  ms  QT Interval= 337  ms  QRS Axis= 9  deg  T Wave Axis= 27  deg  - BORDERLINE ECG -  Sinus tachycardia  Probable left atrial enlargement  Electronically Signed By: Mahamed Cardenas ([facility]) 21-Oct-2019 06:44:15  Date and Time of Study: 2019-10-20 12:52:00                    Ct Abdomen Pelvis Without Contrast    Result Date: 10/20/2019  Moderate diffuse cervical vertebral region of the wall of the cecum, ascending colon, and transverse colon with mild pericolonic fat stranding, indicating a nonspecific colitis, likely infectious/inflammatory. Ischemic colitis is not excluded but felt less likely given the distribution.  Electronically Signed By-Geovanny Cheema On:10/20/2019 6:37 PM This report was finalized on [id number] by  Geovanny Cheema, .    Xr Chest 1 View    Result Date: 10/20/2019  Mildly limited study demonstrating low lung volumes with no active disease.  Electronically Signed ByAtif Cheema On:10/20/2019 1:23 PM This report was finalized on [id number] by  Geovanny Cheema, .    Ct Chest Pulmonary Embolism    Result Date: 10/20/2019   1. The study is negative for pulmonary embolism. No acute intrathoracic abnormality. 2. Low lung volumes with diffuse groundglass opacities throughout both lungs are likely represent atelectasis. 3. Partially included nonspecific thickening of the wall of the hepatic flexure. Evaluate for abdominal pain. Could consider correlation with  colonoscopy if clinically indicated.  Electronically Signed By-Geovanny Cheema On:10/20/2019 3:30 PM This report was finalized on [id number] by  Geovanny Cheema, .      Xrays, labs reviewed personally by physician.    Medication Review:   I have reviewed the patient's current medication list      Scheduled Meds    atenolol 25 mg Oral Daily   atorvastatin 20 mg Oral Daily   DULoxetine 60 mg Oral Daily   famotidine 20 mg Oral BID AC   insulin lispro 0-9 Units Subcutaneous 4x Daily With Meals & Nightly   PARoxetine 60 mg Oral QAM   piperacillin-tazobactam 3.375 g Intravenous Q8H   QUEtiapine 50 mg Oral BID   sodium chloride 10 mL Intravenous Q12H   sucralfate 1 g Oral 4x Daily AC & at Bedtime   vancomycin 125 mg Oral Q6H       Meds Infusions    lactated ringers 125 mL/hr Last Rate: Stopped (10/21/19 1056)   Pharmacy Consult     Pharmacy to Dose Zosyn         Meds PRN  •  acetaminophen **OR** acetaminophen **OR** acetaminophen  •  ALPRAZolam  •  calcium carbonate  •  cyclobenzaprine  •  dextrose  •  dextrose  •  glucagon (human recombinant)  •  HYDROcodone-acetaminophen  •  ketamine (KETALAR) infusion **AND** Ketamine Vital Signs & Assessment  •  ketorolac  •  magnesium sulfate **OR** magnesium sulfate **OR** magnesium sulfate  •  melatonin  •  ondansetron **OR** ondansetron  •  Pharmacy Consult  •  Pharmacy to Dose Zosyn  •  potassium chloride  •  potassium chloride  •  sodium chloride  •  sodium chloride      Assessment / Plan    Active Hospital Problems    Diagnosis  POA   • Chest pain [R07.9]  Yes      Resolved Hospital Problems   No resolved problems to display.       Patient is a 44-year-old male with history of hypertension, diabetes and hyperlipidemia presenting with abdominal pain and chest pressure appearing hemodynamically stable     Acute colitis likely infectious.  Rule out C. difficile given recent antibiotic use.  Check C. difficile  Start p.o. Vanco and IV Zosyn  Stool studies positive for  Campylobacter/enterotoxigenic/enteropathogenic E. coli.  DC Zosyn  Start Zithromax  -IV hydration  -GI cocktail  -IV Protonix  -Sucralfate  -Antiemetics  -Lactate     Chest pressure -patient reports pain when he takes a deep breath.  Heart score of 3 low risk.  Patient does have family history and multiple risk factors including hypertension, diabetes and hyperlipidemia along with obesity.  However patient's primary concerns more abdominal related  -Received full dose aspirin in the ED  -Initial troponin negative.  Repeat evaluation.  -Continue home statin  Patient has significant family history of coronary artery disease multiple family members.  Pending stress test results  Patient had a CT angiogram that was negative for PE       Hyponatremia-no signs of heart failure, may be ADH related stress response  -Trend daily     Polysubstance abuse -patient with prescribed benzos and opiates however U tox also positive for cannabis  -Recommend cessation     Hypertension - relatively controlled  Hold blood pressure medications given borderline low blood pressure.  Patient also has diarrhea and fluid losses.  May restart soon.     Type 2 diabetes -uncertain degree of control at home  -A1c  -Sliding scale     Depression -patient on multiple psychiatric meds.  Consult psychiatristAnxiety -she is on benzodiazepines, uncertain who is prescribing  -Resume for now     Chronic pain -on opiates for multiple years  -Resume while inpatient  -Ketamine as needed for pain     DVT prophylaxis -SCDs              Discharge Planning    Destination      No service coordination in this encounter.      Durable Medical Equipment      No service coordination in this encounter.      Dialysis/Infusion      No service coordination in this encounter.      Home Medical Care      No service coordination in this encounter.      Therapy      No service coordination in this encounter.      Community Resources      No service coordination in this  encounter.          Som Weaver MD  10/22/19  4:20 PM

## 2019-10-22 NOTE — PLAN OF CARE
Problem: Cardiac: ACS (Acute Coronary Syndrome) (Adult)  Goal: Signs and Symptoms of Listed Potential Problems Will be Absent, Minimized or Managed (Cardiac: ACS)  Outcome: Ongoing (interventions implemented as appropriate)   10/21/19 7868   Goal/Outcome Evaluation   Problems Assessed (Acute Coronary Syndrome) all   Problems Present (Acute Coronary Syn) none

## 2019-10-22 NOTE — PLAN OF CARE
Problem: Patient Care Overview  Goal: Plan of Care Review  Outcome: Ongoing (interventions implemented as appropriate)   10/21/19 1921 10/22/19 0428   Coping/Psychosocial   Plan of Care Reviewed With patient --    Plan of Care Review   Progress --  improving   OTHER   Outcome Summary --  education on cdiff moniter pt output of stool , txt plan review with pt        Problem: Diarrhea (Adult)  Goal: Identify Related Risk Factors and Signs and Symptoms  Outcome: Ongoing (interventions implemented as appropriate)   10/22/19 0428   Diarrhea (Adult)   Related Risk Factors (Diarrhea) inflammatory process;infectious process   Signs and Symptoms (Diarrhea) abdominal pain/cramps     Goal: Improved/Reduced Symptoms  Outcome: Ongoing (interventions implemented as appropriate)   10/21/19 1427   Diarrhea (Adult)   Improved/Reduced Symptoms making progress toward outcome

## 2019-10-22 NOTE — PLAN OF CARE
Problem: Patient Care Overview  Goal: Plan of Care Review  Outcome: Ongoing (interventions implemented as appropriate)   10/22/19 5731   Coping/Psychosocial   Plan of Care Reviewed With patient   OTHER   Outcome Summary Pt resting in bed. Reports he is still going to the bathroom frequently. Had a stress test done this morning. Will continue to monitor     Goal: Individualization and Mutuality  Outcome: Ongoing (interventions implemented as appropriate)    Goal: Discharge Needs Assessment  Outcome: Ongoing (interventions implemented as appropriate)    Goal: Interprofessional Rounds/Family Conf  Outcome: Ongoing (interventions implemented as appropriate)

## 2019-10-23 VITALS
OXYGEN SATURATION: 98 % | HEIGHT: 71 IN | RESPIRATION RATE: 14 BRPM | DIASTOLIC BLOOD PRESSURE: 90 MMHG | HEART RATE: 90 BPM | TEMPERATURE: 98 F | BODY MASS INDEX: 34.88 KG/M2 | WEIGHT: 249.12 LBS | SYSTOLIC BLOOD PRESSURE: 144 MMHG

## 2019-10-23 LAB
ALBUMIN SERPL-MCNC: 3.7 G/DL (ref 3.5–5.2)
ALBUMIN/GLOB SERPL: 1.2 G/DL
ALP SERPL-CCNC: 76 U/L (ref 39–117)
ALT SERPL W P-5'-P-CCNC: 11 U/L (ref 1–41)
ANION GAP SERPL CALCULATED.3IONS-SCNC: 12 MMOL/L (ref 5–15)
AST SERPL-CCNC: 12 U/L (ref 1–40)
BASOPHILS # BLD AUTO: 0 10*3/MM3 (ref 0–0.2)
BASOPHILS NFR BLD AUTO: 0.6 % (ref 0–1.5)
BILIRUB SERPL-MCNC: 0.2 MG/DL (ref 0.2–1.2)
BUN BLD-MCNC: 6 MG/DL (ref 6–20)
BUN/CREAT SERPL: 8 (ref 7–25)
C DIFF GDH STL QL: NEGATIVE
CALCIUM SPEC-SCNC: 9 MG/DL (ref 8.6–10.5)
CHLORIDE SERPL-SCNC: 103 MMOL/L (ref 98–107)
CO2 SERPL-SCNC: 25 MMOL/L (ref 22–29)
CREAT BLD-MCNC: 0.75 MG/DL (ref 0.76–1.27)
DEPRECATED RDW RBC AUTO: 40.7 FL (ref 37–54)
EOSINOPHIL # BLD AUTO: 0.3 10*3/MM3 (ref 0–0.4)
EOSINOPHIL NFR BLD AUTO: 3.6 % (ref 0.3–6.2)
ERYTHROCYTE [DISTWIDTH] IN BLOOD BY AUTOMATED COUNT: 13 % (ref 12.3–15.4)
GFR SERPL CREATININE-BSD FRML MDRD: 114 ML/MIN/1.73
GLOBULIN UR ELPH-MCNC: 3.2 GM/DL
GLUCOSE BLD-MCNC: 118 MG/DL (ref 65–99)
GLUCOSE BLDC GLUCOMTR-MCNC: 136 MG/DL (ref 70–105)
GLUCOSE BLDC GLUCOMTR-MCNC: 155 MG/DL (ref 70–105)
HCT VFR BLD AUTO: 38 % (ref 37.5–51)
HGB BLD-MCNC: 13.1 G/DL (ref 13–17.7)
LYMPHOCYTES # BLD AUTO: 2.9 10*3/MM3 (ref 0.7–3.1)
LYMPHOCYTES NFR BLD AUTO: 36.7 % (ref 19.6–45.3)
MCH RBC QN AUTO: 30.9 PG (ref 26.6–33)
MCHC RBC AUTO-ENTMCNC: 34.5 G/DL (ref 31.5–35.7)
MCV RBC AUTO: 89.5 FL (ref 79–97)
MONOCYTES # BLD AUTO: 0.7 10*3/MM3 (ref 0.1–0.9)
MONOCYTES NFR BLD AUTO: 9.1 % (ref 5–12)
NEUTROPHILS # BLD AUTO: 4 10*3/MM3 (ref 1.7–7)
NEUTROPHILS NFR BLD AUTO: 50 % (ref 42.7–76)
NRBC BLD AUTO-RTO: 0.1 /100 WBC (ref 0–0.2)
PLATELET # BLD AUTO: 350 10*3/MM3 (ref 140–450)
PMV BLD AUTO: 6.7 FL (ref 6–12)
POTASSIUM BLD-SCNC: 3.3 MMOL/L (ref 3.5–5.2)
PROT SERPL-MCNC: 6.9 G/DL (ref 6–8.5)
RBC # BLD AUTO: 4.24 10*6/MM3 (ref 4.14–5.8)
SODIUM BLD-SCNC: 140 MMOL/L (ref 136–145)
WBC NRBC COR # BLD: 8 10*3/MM3 (ref 3.4–10.8)

## 2019-10-23 PROCEDURE — G0378 HOSPITAL OBSERVATION PER HR: HCPCS

## 2019-10-23 PROCEDURE — 85025 COMPLETE CBC W/AUTO DIFF WBC: CPT | Performed by: INTERNAL MEDICINE

## 2019-10-23 PROCEDURE — 82962 GLUCOSE BLOOD TEST: CPT

## 2019-10-23 PROCEDURE — 80053 COMPREHEN METABOLIC PANEL: CPT | Performed by: INTERNAL MEDICINE

## 2019-10-23 PROCEDURE — 99217 PR OBSERVATION CARE DISCHARGE MANAGEMENT: CPT | Performed by: INTERNAL MEDICINE

## 2019-10-23 PROCEDURE — 25010000002 VANCOMYCIN 10 G RECONSTITUTED SOLUTION: Performed by: INTERNAL MEDICINE

## 2019-10-23 RX ORDER — CIPROFLOXACIN 500 MG/1
500 TABLET, FILM COATED ORAL 2 TIMES DAILY
Qty: 20 TABLET | Refills: 0 | Status: SHIPPED | OUTPATIENT
Start: 2019-10-23 | End: 2019-11-02

## 2019-10-23 RX ORDER — ALPRAZOLAM 1 MG/1
1 TABLET ORAL ONCE
Status: COMPLETED | OUTPATIENT
Start: 2019-10-23 | End: 2019-10-23

## 2019-10-23 RX ORDER — CHOLESTYRAMINE LIGHT 4 G/5.7G
4 POWDER, FOR SUSPENSION ORAL 2 TIMES DAILY
Qty: 6 PACKET | Refills: 2 | Status: SHIPPED | OUTPATIENT
Start: 2019-10-23 | End: 2019-10-26

## 2019-10-23 RX ORDER — AZITHROMYCIN 500 MG/1
500 TABLET, FILM COATED ORAL DAILY
Qty: 4 TABLET | Refills: 0 | Status: SHIPPED | OUTPATIENT
Start: 2019-10-23 | End: 2019-10-23 | Stop reason: HOSPADM

## 2019-10-23 RX ORDER — METRONIDAZOLE 500 MG/1
500 TABLET ORAL 3 TIMES DAILY
Qty: 30 TABLET | Refills: 0 | Status: SHIPPED | OUTPATIENT
Start: 2019-10-23 | End: 2019-11-02

## 2019-10-23 RX ADMIN — ATENOLOL 25 MG: 25 TABLET ORAL at 09:09

## 2019-10-23 RX ADMIN — AMLODIPINE BESYLATE 5 MG: 5 TABLET ORAL at 09:03

## 2019-10-23 RX ADMIN — AZITHROMYCIN 500 MG: 250 TABLET, FILM COATED ORAL at 09:04

## 2019-10-23 RX ADMIN — QUETIAPINE 50 MG: 25 TABLET, FILM COATED ORAL at 09:04

## 2019-10-23 RX ADMIN — ATORVASTATIN CALCIUM 20 MG: 20 TABLET, FILM COATED ORAL at 09:04

## 2019-10-23 RX ADMIN — ALPRAZOLAM 1 MG: 1 TABLET ORAL at 10:15

## 2019-10-23 RX ADMIN — SUCRALFATE 1 G: 1 TABLET ORAL at 09:03

## 2019-10-23 RX ADMIN — CETIRIZINE HYDROCHLORIDE 10 MG: 10 TABLET, FILM COATED ORAL at 09:03

## 2019-10-23 RX ADMIN — HYDRALAZINE HYDROCHLORIDE 25 MG: 25 TABLET, FILM COATED ORAL at 09:03

## 2019-10-23 RX ADMIN — HYDROCODONE BITARTRATE AND ACETAMINOPHEN 1 TABLET: 7.5; 325 TABLET ORAL at 09:10

## 2019-10-23 RX ADMIN — VANCOMYCIN HYDROCHLORIDE 125 MG: 10 INJECTION, POWDER, LYOPHILIZED, FOR SOLUTION INTRAVENOUS at 05:09

## 2019-10-23 RX ADMIN — ALPRAZOLAM 1 MG: 1 TABLET ORAL at 05:08

## 2019-10-23 RX ADMIN — POTASSIUM CHLORIDE 40 MEQ: 1500 TABLET, EXTENDED RELEASE ORAL at 10:18

## 2019-10-23 RX ADMIN — PAROXETINE HYDROCHLORIDE 60 MG: 20 TABLET, FILM COATED ORAL at 05:08

## 2019-10-23 RX ADMIN — FAMOTIDINE 20 MG: 20 TABLET ORAL at 09:03

## 2019-10-23 RX ADMIN — HYDROCODONE BITARTRATE AND ACETAMINOPHEN 1 TABLET: 7.5; 325 TABLET ORAL at 05:08

## 2019-10-23 RX ADMIN — POTASSIUM CHLORIDE 40 MEQ: 1500 TABLET, EXTENDED RELEASE ORAL at 05:46

## 2019-10-23 RX ADMIN — Medication 10 ML: at 09:11

## 2019-10-23 RX ADMIN — DULOXETINE HYDROCHLORIDE 60 MG: 30 CAPSULE, DELAYED RELEASE ORAL at 09:04

## 2019-10-23 NOTE — DISCHARGE SUMMARY
Date of Admission: 10/20/2019    Date of Discharge:  10/23/2019    Length of stay:  LOS: 0 days     Presenting Problem/History of Present Illness  Active Hospital Problems    Diagnosis  POA   • Chest pain [R07.9]  Yes      Resolved Hospital Problems   No resolved problems to display.          Hospital Course      Patient admitted for abdominal pain chest pain.  Work-up for cardiac etiology was negative with negative stress testing as well.  Patient had diarrhea with evidence of colitis on CT scan.  Patient had stool status post for Bactrim and switch from Zosyn to ciprofloxacin and Flagyl on discharge.  Patient needs follow-up with GI and cardiology service on outpatient basis.  She was very anxious and frustrated as his test results took some time to come back.  He was crying several times and because of his recent loss of his sister.  He is not sure was the reason for death   Patient was very upset and offended when I offered psychiatric evaluation and he said he has an appointment with a therapist that would be paid for by his work 100%.  So further evaluation with psychiatry was canceled and he denies any suicidal or homicidal ideation.          Chief Complaint   Patient presents with   • Chest Pain         Past Medical History:     Past Medical History:   Diagnosis Date   • Depression    • Diabetes mellitus (CMS/HCC)    • Hyperlipidemia    • Hypertension        Past Surgical History:     Past Surgical History:   Procedure Laterality Date   • BACK SURGERY         Social History:   Social History     Socioeconomic History   • Marital status: Single     Spouse name: Not on file   • Number of children: Not on file   • Years of education: Not on file   • Highest education level: Not on file   Tobacco Use   • Smoking status: Never Smoker   Substance and Sexual Activity   • Alcohol use: No     Frequency: Never   • Drug use: No   • Sexual activity: No       Procedures Performed         Vital Signs  Temp:  [98 °F  (36.7 °C)-98.6 °F (37 °C)] 98 °F (36.7 °C)  Heart Rate:  [] 90  Resp:  [12-17] 14  BP: (125-169)/() 144/90    Physical Exam:  Physical Exam   Constitutional: He is oriented to person, place, and time. He appears well-developed and well-nourished. No distress.   HENT:   Head: Normocephalic and atraumatic.   Right Ear: External ear normal.   Left Ear: External ear normal.   Nose: Nose normal.   Mouth/Throat: Oropharynx is clear and moist. No oropharyngeal exudate.   Eyes: Conjunctivae and EOM are normal. Pupils are equal, round, and reactive to light. Right eye exhibits no discharge. Left eye exhibits no discharge. No scleral icterus.   Neck: Normal range of motion. No JVD present. No tracheal deviation present. No thyromegaly present.   Cardiovascular: Normal rate, regular rhythm, normal heart sounds and intact distal pulses. Exam reveals no gallop and no friction rub.   No murmur heard.  Pulmonary/Chest: Effort normal and breath sounds normal. No stridor. No respiratory distress. He has no wheezes. He has no rales. He exhibits no tenderness.   Abdominal: Soft. Bowel sounds are normal. He exhibits no distension and no mass. There is no tenderness. There is no rebound and no guarding. No hernia.   Musculoskeletal: Normal range of motion. He exhibits no edema, tenderness or deformity.   Lymphadenopathy:     He has no cervical adenopathy.   Neurological: He is alert and oriented to person, place, and time. No cranial nerve deficit or sensory deficit. He exhibits normal muscle tone. Coordination normal.   Skin: Skin is warm and dry. No rash noted. He is not diaphoretic. No erythema.   Psychiatric: He has a normal mood and affect. His behavior is normal.   Nursing note and vitals reviewed.      Discharge Diagnosis:     Chest pain      Present on Admission:  • Chest pain      Discharge Disposition  Home or Self Care       Discharge Medications      New Medications      Instructions Start Date   cholestyramine  light 4 g packet  Commonly known as:  PREVALITE   4 g, Oral, 2 Times Daily      ciprofloxacin 500 MG tablet  Commonly known as:  CIPRO   500 mg, Oral, 2 Times Daily      metroNIDAZOLE 500 MG tablet  Commonly known as:  FLAGYL   500 mg, Oral, 3 Times Daily         Continue These Medications      Instructions Start Date   ALPRAZolam 1 MG tablet  Commonly known as:  XANAX   1 mg, Oral, 4 Times Daily PRN      amLODIPine 10 MG tablet  Commonly known as:  NORVASC   5 mg, Oral, 2 Times Daily      atenolol 25 MG tablet  Commonly known as:  TENORMIN   25 mg, Oral, Daily      atorvastatin 20 MG tablet  Commonly known as:  LIPITOR   20 mg, Oral, Daily      ciprofloxacin-dexamethasone 0.3-0.1 % otic suspension  Commonly known as:  CIPRODEX   4 drops, Left Ear, 2 Times Daily      cyclobenzaprine 10 MG tablet  Commonly known as:  FLEXERIL   10 mg, Oral, 2 Times Daily PRN      DULoxetine 60 MG capsule  Commonly known as:  CYMBALTA   60 mg, Oral, Daily      hydrALAZINE 25 MG tablet  Commonly known as:  APRESOLINE   25 mg, Oral, Daily      HYDROcodone-acetaminophen 7.5-325 MG per tablet  Commonly known as:  NORCO   1 tablet, Oral, Every 4 Hours PRN      lisinopril 40 MG tablet  Commonly known as:  PRINIVIL,ZESTRIL   40 mg, Oral, Daily      metFORMIN 1000 MG tablet  Commonly known as:  GLUCOPHAGE   1,000 mg, Oral, 2 Times Daily With Meals      PARoxetine 30 MG tablet  Commonly known as:  PAXIL   60 mg, Oral, Every Morning      pioglitazone 30 MG tablet  Commonly known as:  ACTOS   45 mg, Oral, Daily      QUEtiapine 50 MG tablet  Commonly known as:  SEROquel   50 mg, Oral, 2 Times Daily         Stop These Medications    amoxicillin-clavulanate 875-125 MG per tablet  Commonly known as:  AUGMENTIN                Consults:   Consults     Date and Time Order Name Status Description    10/23/2019 1012 Inpatient Gastroenterology Consult      10/20/2019 1527 Hospitalist (on-call MD unless specified) Completed           Pertinent Test  Results:     I reviewed the patient's new clinical results.    Results from last 7 days   Lab Units 10/23/19  0417 10/22/19  0514 10/21/19  1009   WBC 10*3/mm3 8.00 6.70 9.50   HEMOGLOBIN g/dL 13.1 12.4* 12.6*   HEMATOCRIT % 38.0 36.3* 37.8   PLATELETS 10*3/mm3 350 294 300     Results from last 7 days   Lab Units 10/23/19  0417 10/22/19  0419 10/21/19  1008   SODIUM mmol/L 140 138 135*   POTASSIUM mmol/L 3.3* 3.7 3.5   CHLORIDE mmol/L 103 102 100   CO2 mmol/L 25.0 26.0 24.0   BUN mg/dL 6 6 7   CREATININE mg/dL 0.75* 0.82 0.78   GLUCOSE mg/dL 118* 140* 182*   CALCIUM mg/dL 9.0 9.2 8.9     Results from last 7 days   Lab Units 10/23/19  0417 10/22/19  0419 10/21/19  1008   SODIUM mmol/L 140 138 135*   POTASSIUM mmol/L 3.3* 3.7 3.5   CHLORIDE mmol/L 103 102 100   CO2 mmol/L 25.0 26.0 24.0   BUN mg/dL 6 6 7   CREATININE mg/dL 0.75* 0.82 0.78   CALCIUM mg/dL 9.0 9.2 8.9   BILIRUBIN mg/dL 0.2 0.2 <0.2*   ALK PHOS U/L 76 77 77   ALT (SGPT) U/L 11 9 9   AST (SGOT) U/L 12 12 10   GLUCOSE mg/dL 118* 140* 182*         Lab Results   Component Value Date    CALCIUM 9.0 10/23/2019     Hemoglobin A1C   Date Value Ref Range Status   10/20/2019 6.1 (H) 3.5 - 5.6 % Final             Microbiology Results (last 10 days)     Procedure Component Value - Date/Time    Clostridium Difficile Toxin - Stool, Per Rectum [[id number]] Collected:  10/22/19 1722    Lab Status:  Final result Specimen:  Stool from Per Rectum Updated:  10/23/19 0841    Narrative:       The following orders were created for panel order Clostridium Difficile Toxin - Stool, Per Rectum.  Procedure                               Abnormality         Status                     ---------                               -----------         ------                     Clostridium Difficile EI...[[id number]]  Normal              Final result                 Please view results for these tests on the individual orders.    Clostridium Difficile EIA - Stool, Per Rectum [[id number]]   (Normal) Collected:  10/22/19 1722    Lab Status:  Final result Specimen:  Stool from Per Rectum Updated:  10/23/19 0841     C Diff GDH / Toxin Negative    Gastrointestinal Panel, PCR - Stool, Per Rectum [[id number]]  (Abnormal) Collected:  10/21/19 1039    Lab Status:  Final result Specimen:  Stool from Per Rectum Updated:  10/21/19 1533     Campylobacter Detected     Plesiomonas shigelloides Not Detected     Salmonella Not Detected     Vibrio Not Detected     Vibrio cholerae Not Detected     Yersinia enterocolitica Not Detected     Enteroaggregative E. coli (EAEC) Not Detected     Enteropathogenic E. coli (EPEC) Detected     Enterotoxigenic E. coli (ETEC) lt/st Detected     Shiga-like toxin-producing E. coli (STEC) stx1/stx2 Not Detected     E. coli O157 Not Detected     Shigella/Enteroinvasive E. coli (EIEC) Not Detected     Cryptosporidium Not Detected     Cyclospora cayetanensis Not Detected     Entamoeba histolytica Not Detected     Giardia lamblia Not Detected     Adenovirus F40/41 Not Detected     Astrovirus Not Detected     Norovirus GI/GII Not Detected     Rotavirus A Not Detected     Sapovirus (I, II, IV or V) Not Detected          ECG/EMG Results (most recent)     Procedure Component Value Units Date/Time    ECG 12 Lead [[id number]] Collected:  10/20/19 1250     Updated:  10/21/19 0644    Narrative:       HEART RATE= 107  bpm  RR Interval= 560  ms  SC Interval= 150  ms  P Horizontal Axis= -3  deg  P Front Axis= 50  deg  QRSD Interval= 94  ms  QT Interval= 337  ms  QRS Axis= 9  deg  T Wave Axis= 27  deg  - BORDERLINE ECG -  Sinus tachycardia  Probable left atrial enlargement  Electronically Signed By: Mahamed Cardenas (Ran) 21-Oct-2019 06:44:15  Date and Time of Study: 2019-10-20 12:52:00                    Ct Abdomen Pelvis Without Contrast    Result Date: 10/20/2019  Moderate diffuse cervical vertebral region of the wall of the cecum, ascending colon, and transverse colon with mild pericolonic fat  stranding, indicating a nonspecific colitis, likely infectious/inflammatory. Ischemic colitis is not excluded but felt less likely given the distribution.  Electronically Signed By-Geovanny Cheema On:10/20/2019 6:37 PM This report was finalized on [id number] by  Geovanny Cheema, .    Xr Chest 1 View    Result Date: 10/20/2019  Mildly limited study demonstrating low lung volumes with no active disease.  Electronically Signed By-Geovanny Cheema On:10/20/2019 1:23 PM This report was finalized on [id number] by  Geovanny Cheema, .    Ct Chest Pulmonary Embolism    Result Date: 10/20/2019   1. The study is negative for pulmonary embolism. No acute intrathoracic abnormality. 2. Low lung volumes with diffuse groundglass opacities throughout both lungs are likely represent atelectasis. 3. Partially included nonspecific thickening of the wall of the hepatic flexure. Evaluate for abdominal pain. Could consider correlation with colonoscopy if clinically indicated.  Electronically Signed ByAtif Cheema On:10/20/2019 3:30 PM This report was finalized on [id number] by  Geovanny Cheema, .          Condition on Discharge:    Stable    Discharge Diet:   Low residue diet diabetic  Activity at Discharge:   As tolerated  Follow-up Appointments  No future appointments.  Additional Instructions for the Follow-ups that You Need to Schedule     Discharge Follow-up with PCP   As directed       Currently Documented PCP:    Venancio Hurley MD    PCP Phone Number:    [phone]     Follow Up Details:  1 week         Discharge Follow-up with Specialty: GI; 2 Weeks   As directed      Specialty:  GI    Follow Up:  2 Weeks               Test Results Pending at Discharge       Risk for Readmission (LACE) Score: 4 (10/23/2019  6:00 AM)          Som Weaver MD  10/23/19  11:47 AM    Time: Greater than 30 minutes in discharge planning

## 2019-10-23 NOTE — PLAN OF CARE
Problem: Cardiac: ACS (Acute Coronary Syndrome) (Adult)  Goal: Signs and Symptoms of Listed Potential Problems Will be Absent, Minimized or Managed (Cardiac: ACS)  Outcome: Ongoing (interventions implemented as appropriate)   10/23/19 0303   Goal/Outcome Evaluation   Problems Assessed (Acute Coronary Syndrome) other (see comments)   Problems Present (Acute Coronary Syn) none      10/23/19 0303   Goal/Outcome Evaluation   Problems Assessed (Acute Coronary Syndrome) other (see comments)   Problems Present (Acute Coronary Syn) none       Problem: Patient Care Overview  Goal: Plan of Care Review  Outcome: Ongoing (interventions implemented as appropriate)   10/22/19 0428 10/23/19 0303   Coping/Psychosocial   Plan of Care Reviewed With --  patient   Plan of Care Review   Progress improving --    OTHER   Outcome Summary --  resting , adb pain better , moniter pt , resting in bed with no complaints await lab results        Problem: Diarrhea (Adult)  Goal: Identify Related Risk Factors and Signs and Symptoms  Outcome: Ongoing (interventions implemented as appropriate)   10/22/19 0428   Diarrhea (Adult)   Related Risk Factors (Diarrhea) inflammatory process;infectious process   Signs and Symptoms (Diarrhea) abdominal pain/cramps     Goal: Improved/Reduced Symptoms  Outcome: Ongoing (interventions implemented as appropriate)   10/21/19 1427   Diarrhea (Adult)   Improved/Reduced Symptoms making progress toward outcome

## 2019-10-23 NOTE — NURSING NOTE
Yomaira Hudson, discuss with provider BP and that home med on hold , obtain new orders. Patient inform of new orders and oral med given

## 2019-10-23 NOTE — PAYOR COMM NOTE
"Please be advised this is an Observation Notification as the patient remained in observation status greater than 48 hrs and we were instructed to notify you and to initiate an observation auth request.   See attached face sheet and all clinical.        Danielle Glynn RN  Utilization Review          [facility]   [address] [place]  [address] [phone] Office  [phone] Fax  [email or web address]   [facility]         Gurmeet Schulte (44 y.o. Male)     Date of Birth Social Security Number Address Home Phone MRN    [id number]  [id number] [place] [address] [phone] [id number]    Mandaeism Marital Status          Episcopal Single       Admission Date Admission Type Admitting Provider Attending Provider Department, Room/Bed    10/20/19 Emergency Som Weaver MD Gad, George Fayez Labib Youssief, MD [facility] 2B MEDICAL INPATIENT, 231/1    Discharge Date Discharge Disposition Discharge Destination         Home or Self Care              Attending Provider:  Som Weaver MD    Allergies:  Prochlorperazine    Isolation:  Spore   Infection:  Campylobacter (10/21/19), C.difficile (rule out) (10/21/19)   Code Status:  CPR    Ht:  180.3 cm (70.98\")   Wt:  113 kg (249 lb 1.9 oz)    Admission Cmt:  None   Principal Problem:  None                Active Insurance as of 10/20/2019     Primary Coverage     Payor Plan Insurance Group Employer/Plan Group    MISC COMMERCIAL MISC COMMERCIAL [id number]     Coverage Address Coverage Phone Number Coverage Fax Number Effective Dates    [address] [address] [phone]  9/14/2019 - None Entered    [facility] [id number]       Subscriber Name Subscriber Birth Date Member ID       GURMEET SCHULTE MARIZA 1975 [id number]                 Emergency Contacts      (Rel.) Home Phone Work Phone Mobile Phone    primop[employer]el (Father) -- -- [phone]    MARTA SCHULTE (Mother) -- -- " [phone]               History & Physical      Ramón Benjamin MD at 10/20/19 1621                [facility] Medicine Services            Primary Care Provider:  Venancio Hurley MD    Patient Care Team:  Venancio Hurley MD as PCP - General    CHIEF COMPLAINT:     Chief Complaint   Patient presents with   • Chest Pain       Abdominal pain    HISTORY OF PRESENT ILLNESS:    Patient is a 44-year-old male with history of depression, hypertension, diabetes presenting for evaluation of 1 day of chest pain.  Patient reports a great deal of emotional strain recently because he found out his 32-year-old sister  in her sleep suddenly currently unknown circumstances.  Patient reports upon getting out of bed this morning he has had a significant amount of abdominal pain, mostly periumbilical along with feelings of pressure in his abdomen and some along his chest.  Patient reports getting dizzy upon getting out of bed.  Patient now reports abdominal pain is primary symptom.  He denies any history of previous cardiac disease.  Diaphoresis noted earlier today.  No radiation of pain to jaw or down his arm.  Denies any constitutional infectious symptoms such as fevers, chills, fatigue.  No cough or sputum production.    Patient has first-generation family history of heart disease in his father who had his first cardiac intervention in his 50s for coronary artery disease.    In the emergency department patient is hemodynamically stable.  Labs significant for glucose 167, sodium of 132, potassium 4.1 however of note labs were hemolyzed uncertain of veracity.  White count 21.  Urine drug screen positive for opiates marijuana and benzodiazepines.  Initial troponins negative.  Emergency department concern for primary cardiac event.  EKG with significant artifact appears sinus no significant ST segment changes.  Patient had a CT of chest with contrast showing no PE or acute pulmonary findings.  Patient  admitted for evaluation of abdominal pain.          Past Medical History:   Diagnosis Date   • Depression    • Diabetes mellitus (CMS/HCC)    • Hyperlipidemia    • Hypertension        Past Surgical History:   Procedure Laterality Date   • BACK SURGERY         No family history on file.    Social History     Tobacco Use   • Smoking status: Never Smoker   Substance Use Topics   • Alcohol use: No     Frequency: Never   • Drug use: No       Medications Prior to Admission   Medication Sig Dispense Refill Last Dose   • ALPRAZolam (XANAX) 1 MG tablet Take 1 mg by mouth 4 (Four) Times a Day As Needed for Anxiety.      • amLODIPine (NORVASC) 10 MG tablet Take 5 mg by mouth 2 (Two) Times a Day.      • atorvastatin (LIPITOR) 20 MG tablet Take 20 mg by mouth Daily.      • cyclobenzaprine (FLEXERIL) 10 MG tablet Take 10 mg by mouth 2 (Two) Times a Day As Needed for Muscle Spasms.      • DULoxetine (CYMBALTA) 60 MG capsule Take 60 mg by mouth Daily.      • hydrALAZINE (APRESOLINE) 25 MG tablet Take 25 mg by mouth Daily.      • HYDROcodone-acetaminophen (NORCO) 7.5-325 MG per tablet Take 1 tablet by mouth Every 4 (Four) Hours As Needed for Moderate Pain .      • lisinopril (PRINIVIL,ZESTRIL) 40 MG tablet Take 40 mg by mouth Daily.      • metFORMIN (GLUCOPHAGE) 1000 MG tablet Take 1,000 mg by mouth 2 (Two) Times a Day With Meals.      • PARoxetine (PAXIL) 30 MG tablet Take 60 mg by mouth Every Morning.      • pioglitazone (ACTOS) 30 MG tablet Take 45 mg by mouth Daily.      • QUEtiapine (SEROquel) 50 MG tablet Take 50 mg by mouth 2 (Two) Times a Day.      • amoxicillin-clavulanate (AUGMENTIN) 875-125 MG per tablet Take 1 tablet by mouth 2 (Two) Times a Day. 20 tablet 0 Unknown at Unknown time   • atenolol (TENORMIN) 25 MG tablet Take 25 mg by mouth Daily.   Unknown at Unknown time   • ciprofloxacin-dexamethasone (CIPRODEX) 0.3-0.1 % otic suspension Administer 4 drops into the left ear 2 (Two) Times a Day. 7.5 mL 0 Unknown at  "Unknown time       Allergies:  Prochlorperazine      There is no immunization history on file for this patient.        REVIEW OF SYSTEMS:     Review of Systems   Constitution: Positive for diaphoresis. Negative for chills and fever.   Cardiovascular: Negative for chest pain, irregular heartbeat and orthopnea.   Respiratory: Negative for cough and shortness of breath.    Musculoskeletal: Positive for back pain and joint pain.   Gastrointestinal: Positive for bloating, abdominal pain and diarrhea. Negative for bowel incontinence and jaundice.   Neurological: Positive for dizziness. Negative for focal weakness.   Psychiatric/Behavioral: Positive for substance abuse. The patient is nervous/anxious.        Vital Signs  Temp:  [97.8 °F (36.6 °C)] 97.8 °F (36.6 °C)  Heart Rate:  [] 102  Resp:  [24] 24  BP: (139-145)/(75-83) 144/75    Flowsheet Rows      First Filed Value   Admission Height  180.3 cm (71\") Documented at 10/20/2019 1249   Admission Weight  99.8 kg (220 lb) Documented at 10/20/2019 1249           Physical Exam:    Physical Exam   Constitutional: He is oriented to person, place, and time. He appears well-nourished. He appears distressed.   Obese male sitting in a wheelchair appearing tearful   HENT:   Head: Normocephalic and atraumatic.   Right Ear: External ear normal.   Left Ear: External ear normal.   Nose: Nose normal.   Mouth/Throat: Oropharynx is clear and moist. No oropharyngeal exudate.   Eyes: Conjunctivae and EOM are normal. Right eye exhibits no discharge. Left eye exhibits no discharge. No scleral icterus.   Neck: Normal range of motion. Neck supple. No tracheal deviation present.   Cardiovascular: Normal rate, regular rhythm and normal heart sounds.   No murmur heard.  Pulmonary/Chest: Effort normal and breath sounds normal. No stridor. No respiratory distress. He has no wheezes.   Abdominal: Soft. He exhibits no distension and no mass. There is tenderness.   Diffuse tenderness more " prominent periumbilical   Musculoskeletal: Normal range of motion. He exhibits no edema or deformity.   Neurological: He is alert and oriented to person, place, and time. No cranial nerve deficit. He exhibits normal muscle tone.   Skin: Skin is warm and dry. He is not diaphoretic. No erythema.             Results Review:      I reviewed the patient's new clinical results.    Lab Results (most recent)     Procedure Component Value Units Date/Time    Urine Drug Screen - Urine, Clean Catch [[id number]]  (Abnormal) Collected:  10/20/19 1403    Specimen:  Urine, Clean Catch Updated:  10/20/19 1459     Amphet/Methamphet, Screen Negative     Barbiturates Screen, Urine Negative     Benzodiazepine Screen, Urine Positive     Cocaine Screen, Urine Negative     Opiate Screen Positive     THC, Screen, Urine Positive     Methadone Screen, Urine Negative     Oxycodone Screen, Urine Negative    Narrative:       Negative Thresholds For Drugs Screened:     Amphetamines               500 ng/ml   Barbiturates               200 ng/ml   Benzodiazepines            100 ng/ml   Cocaine                    300 ng/ml   Methadone                  300 ng/ml   Opiates                    300 ng/ml   Oxycodone                  100 ng/ml   THC                        50 ng/ml    The Normal Value for all drugs tested is negative. This report includes final unconfirmed screening results to be used for medical treatment purposes only. Unconfirmed results must not be used for non-medical purposes such as employment or legal testing. Clinical consideration should be applied to any drug of abuse test, particulary when unconfirmed results are used.  All urine drugs of abuse requests without chain of custody are for medical screening purposes only.  False positives are possible.      D-dimer, Quantitative [[id number]]  (Abnormal) Collected:  10/20/19 1254    Specimen:  Blood Updated:  10/20/19 1423     D-Dimer, Quantitative 1.65 MCGFEU/mL     Narrative:        Reference Range  --------------------------------------------------------------------     < 0.50   Negative Predictive Value  0.50-0.59   Indeterminate    >= 0.60   Probable VTE             A very low percentage of patients with DVT may yield D-Dimer results   below the cut-off of 0.50 MCGFEU/mL.  This is known to be more   prevalent in patients with distal DVT.             Results of this test should always be interpreted in conjunction with   the patient's medical history, clinical presentation and other   findings.  Clinical diagnosis should not be based on the result of   INNOVANCE D-Dimer alone.    [place] Draw [[id number]] Collected:  10/20/19 1254    Specimen:  Blood Updated:  10/20/19 1400    Narrative:       The following orders were created for panel order [place] Draw.  Procedure                               Abnormality         Status                     ---------                               -----------         ------                     Light Blue Top[[id number]]                                   Final result               Green Top (Gel)[[id number]]                                  Final result               Lavender Top[[id number]]                                     Final result               Gold Top - SST[[id number]]                                   Final result                 Please view results for these tests on the individual orders.    Light Blue Top [[id number]] Collected:  10/20/19 1254    Specimen:  Blood Updated:  10/20/19 1400     Extra Tube hold for add-on     Comment: Auto resulted       Green Top (Gel) [[id number]] Collected:  10/20/19 1254    Specimen:  Blood Updated:  10/20/19 1400     Extra Tube Hold for add-ons.     Comment: Auto resulted.       Lavender Top [[id number]] Collected:  10/20/19 1254    Specimen:  Blood Updated:  10/20/19 1400     Extra Tube hold for add-on     Comment: Auto resulted       Gold Top - SST [[id number]] Collected:  10/20/19 1254    Specimen:  Blood Updated:   10/20/19 1400     Extra Tube Hold for add-ons.     Comment: Auto resulted.       Comprehensive Metabolic Panel [[id number]]  (Abnormal) Collected:  10/20/19 1254    Specimen:  Blood Updated:  10/20/19 1343     Glucose 167 mg/dL      BUN 8 mg/dL      Creatinine 0.98 mg/dL      Sodium 132 mmol/L      Potassium 4.1 mmol/L      Comment: Specimen hemolyzed.  Results may be affected.        Chloride 94 mmol/L      CO2 18.0 mmol/L      Calcium 9.6 mg/dL      Total Protein 7.8 g/dL      Albumin 4.20 g/dL      ALT (SGPT) 12 U/L      Comment: Specimen hemolyzed.  Results may be affected.        AST (SGOT) 16 U/L      Comment: Specimen hemolyzed.  Results may be affected.        Alkaline Phosphatase 96 U/L      Total Bilirubin 0.5 mg/dL      eGFR Non African Amer 84 mL/min/1.73      Globulin 3.6 gm/dL      A/G Ratio 1.2 g/dL      BUN/Creatinine Ratio 8.2     Anion Gap 20.0 mmol/L     Narrative:       GFR Normal >60  Chronic Kidney Disease <60  Kidney Failure <15    Troponin [[id number]]  (Normal) Collected:  10/20/19 1254    Specimen:  Blood Updated:  10/20/19 1342     Troponin T <0.010 ng/mL     Narrative:       Troponin T Reference Range:  <= 0.03 ng/mL-   Negative for AMI  >0.03 ng/mL-     Abnormal for myocardial necrosis.  Clinicians would have to utilize clinical acumen, EKG, Troponin and serial changes to determine if it is an Acute Myocardial Infarction or myocardial injury due to an underlying chronic condition.     CBC & Differential [[id number]] Collected:  10/20/19 1254    Specimen:  Blood Updated:  10/20/19 0982    Narrative:       The following orders were created for panel order CBC & Differential.  Procedure                               Abnormality         Status                     ---------                               -----------         ------                     CBC Auto Differential[[id number]]        Abnormal            Final result                 Please view results for these tests on the individual  orders.    CBC Auto Differential [[id number]]  (Abnormal) Collected:  10/20/19 1254    Specimen:  Blood Updated:  10/20/19 1304     WBC 21.00 10*3/mm3      RBC 4.72 10*6/mm3      Hemoglobin 14.2 g/dL      Hematocrit 42.6 %      MCV 90.3 fL      MCH 30.1 pg      MCHC 33.3 g/dL      RDW 13.2 %      RDW-SD 42.0 fl      MPV 7.3 fL      Platelets 362 10*3/mm3      Neutrophil % 83.9 %      Lymphocyte % 10.1 %      Monocyte % 5.5 %      Eosinophil % 0.0 %      Basophil % 0.5 %      Neutrophils, Absolute 17.60 10*3/mm3      Lymphocytes, Absolute 2.10 10*3/mm3      Monocytes, Absolute 1.20 10*3/mm3      Eosinophils, Absolute 0.00 10*3/mm3      Basophils, Absolute 0.10 10*3/mm3      nRBC 0.0 /100 WBC           Imaging Results (most recent)     Procedure Component Value Units Date/Time    CT Chest Pulmonary Embolism [[id number]] Collected:  10/20/19 1525     Updated:  10/20/19 1532    Narrative:          DATE OF EXAM:  10/20/2019 3:00 PM     PROCEDURE:  CT CHEST PULMONARY EMBOLISM-     INDICATIONS:   Stable chest pain. Short of breath.     COMPARISON:   Chest radiograph 10/20/2019.     TECHNIQUE:  Routine transaxial slices were obtained through chest after  administration of intravenous 68 ml of Isovue 370. Reconstructed coronal  and sagittal images were also obtained. Automated exposure control and  iterative reconstruction methods were used.      FINDINGS:  No evidence of a pulmonary arterial filling defect to suggest pulmonary  embolism. The heart and great vessels of the chest are normal in size.  No pericardial effusion. No pathologically enlarged intrathoracic lymph  nodes are identified. Heterogeneous enlarged thyroid gland.     Low lung volumes. Diffuse groundglass opacities throughout both lungs  that likely represent atelectasis. No focal lung consolidation. Central  airways are widely patent. No pneumothorax or pleural effusion.     Limited imaging of the upper abdomen partially includes nonspecific  thickening of the  wall of the hepatic flexure.     Partially visualized lower cervical spinal fusion hardware. No acute  osseous abnormality or concerning osseous lesion.        Impression:          1. The study is negative for pulmonary embolism. No acute intrathoracic  abnormality.  2. Low lung volumes with diffuse groundglass opacities throughout both  lungs are likely represent atelectasis.  3. Partially included nonspecific thickening of the wall of the hepatic  flexure. Evaluate for abdominal pain. Could consider correlation with  colonoscopy if clinically indicated.     Electronically Signed By-Geovanny Cheema On:10/20/2019 3:30 PM  This report was finalized on [id number] by  Geovanny Cheema, .    XR Chest 1 View [[id number]] Collected:  10/20/19 1322     Updated:  10/20/19 1325    Narrative:       DATE OF EXAM:  10/20/2019 1:09 PM     PROCEDURE:  XR CHEST 1 VW-     INDICATIONS:  Chest pain protocol     COMPARISON:  None available.     TECHNIQUE:   Single radiographic AP view of the chest was obtained.     FINDINGS:  The study is mildly limited by lordotic patient positioning and the  patient's body habitus. Overlying artifacts. Low lung volumes. The lungs  are grossly clear. No pneumothorax. Cardiomediastinal contours within  normal limits. Lower cervical spinal fusion hardware. No acute osseous  abnormality is identified.        Impression:       Mildly limited study demonstrating low lung volumes with no active  disease.     Electronically Signed By-Geovanny Cheema On:10/20/2019 1:23 PM  This report was finalized on [id number] by  Geovanny Cheema, .        reviewed    ECG/EMG Results (most recent)     Procedure Component Value Units Date/Time    ECG 12 Lead [[id number]] Collected:  10/20/19 1250     Updated:  10/20/19 1251    Narrative:       HEART RATE= 111  bpm  RR Interval= 540  ms  SD Interval= 141  ms  P Horizontal Axis= 3  deg  P Front Axis= 56  deg  QRSD Interval= 93  ms  QT Interval= 334  ms  QRS Axis= 15  deg  T Wave Axis=  21  deg  - BORDERLINE ECG -  Sinus tachycardia  Left atrial enlargement  Electronically Signed By:   Date and Time of Study: 2019-10-20 12:50:21        reviewed              CT Chest Pulmonary Embolism  Narrative:    DATE OF EXAM:  10/20/2019 3:00 PM     PROCEDURE:  CT CHEST PULMONARY EMBOLISM-     INDICATIONS:   Stable chest pain. Short of breath.     COMPARISON:   Chest radiograph 10/20/2019.     TECHNIQUE:  Routine transaxial slices were obtained through chest after  administration of intravenous 68 ml of Isovue 370. Reconstructed coronal  and sagittal images were also obtained. Automated exposure control and  iterative reconstruction methods were used.      FINDINGS:  No evidence of a pulmonary arterial filling defect to suggest pulmonary  embolism. The heart and great vessels of the chest are normal in size.  No pericardial effusion. No pathologically enlarged intrathoracic lymph  nodes are identified. Heterogeneous enlarged thyroid gland.     Low lung volumes. Diffuse groundglass opacities throughout both lungs  that likely represent atelectasis. No focal lung consolidation. Central  airways are widely patent. No pneumothorax or pleural effusion.     Limited imaging of the upper abdomen partially includes nonspecific  thickening of the wall of the hepatic flexure.     Partially visualized lower cervical spinal fusion hardware. No acute  osseous abnormality or concerning osseous lesion.      Impression:    1. The study is negative for pulmonary embolism. No acute intrathoracic  abnormality.  2. Low lung volumes with diffuse groundglass opacities throughout both  lungs are likely represent atelectasis.  3. Partially included nonspecific thickening of the wall of the hepatic  flexure. Evaluate for abdominal pain. Could consider correlation with  colonoscopy if clinically indicated.     Electronically Signed By-Geovanny Cheema On:10/20/2019 3:30 PM  This report was finalized on [id number] by  Geovanny Cheema, .  XR  Chest 1 View  Narrative: DATE OF EXAM:  10/20/2019 1:09 PM     PROCEDURE:  XR CHEST 1 VW-     INDICATIONS:  Chest pain protocol     COMPARISON:  None available.     TECHNIQUE:   Single radiographic AP view of the chest was obtained.     FINDINGS:  The study is mildly limited by lordotic patient positioning and the  patient's body habitus. Overlying artifacts. Low lung volumes. The lungs  are grossly clear. No pneumothorax. Cardiomediastinal contours within  normal limits. Lower cervical spinal fusion hardware. No acute osseous  abnormality is identified.      Impression: Mildly limited study demonstrating low lung volumes with no active  disease.     Electronically Signed By-Geovanny Cheema On:10/20/2019 1:23 PM  This report was finalized on [id number] by  Geovanny Cheema, .      Active Hospital Problems    Diagnosis  POA   • Chest pain [R07.9]  Yes      Resolved Hospital Problems   No resolved problems to display.         Assessment/Plan       Chest pain    Patient is a 44-year-old male with history of hypertension, diabetes and hyperlipidemia presenting with abdominal pain and chest pressure appearing hemodynamically stable    Abdominal pain -primary complaint per patient.  Patient reported diffuse abdominal pain along with diarrhea.  Also noted to have a white count.  Consider gastritis versus diverticulitis versus colitis versus pancreatitis.  -CT abdomen pelvis  -IV hydration  -GI cocktail  -IV Protonix  -Sucralfate  -Antiemetics  -Lactate    Chest pressure -patient reports pain when he takes a deep breath.  Heart score of 3 low risk.  Patient does have family history and multiple risk factors including hypertension, diabetes and hyperlipidemia along with obesity.  However patient's primary concerns more abdominal related  -Received full dose aspirin in the ED  -Can trend troponins x3  -Less likely cardiac at this time would hold on stress test will defer to primary team tomorrow  -Continue home  statin    Leukocytosis -uncertain source.  No pulmonary findings  -Trend daily  -proCalcitonin  -Hold off on antibiotics no source at this time    Hyponatremia-no signs of heart failure, may be ADH related stress response  -Trend daily    Anion gap metabolic acidosis -uncertain source at this time  -Check lactate    Polysubstance abuse -patient with prescribed benzos and opiates however U tox also positive for cannabis  -Recommend cessation    Hypertension - relatively controlled  -Resume home meds    Type 2 diabetes -uncertain degree of control at home  -A1c  -Sliding scale    Depression -patient on multiple psychiatric meds.  Patient very tearful and burst out crying multiple times during exam  -If persists recommend psych eval  -Resume home meds    Anxiety -she is on benzodiazepines, uncertain who is prescribing  -Resume for now    Chronic pain -on opiates for multiple years  -Resume while inpatient  -Ketamine as needed for pain    DVT prophylaxis -SCDs    Disposition    Observation, hopefully discharge in the next 24 to 48 hours    I discussed the patients findings and my recommendations with patient and family.     Ramón Benjamin MD  10/20/19  4:22 PM            Electronically signed by Ramón Benjamin MD at 10/20/19 1648          Physician Progress Notes (last 72 hours) (Notes from 10/20/19 1211 through 10/23/19 1211)      Som Weaver MD at 10/22/19 1620                [facility] Medicine Services Daily Progress Note      Hospitalist Team  LOS 0 days      Patient Care Team:  Venancio Hurley MD as PCP - General        Chief Complaint / Subjective  Chief Complaint   Patient presents with   • Chest Pain       Brief Synopsis of Hospital Course/HPI    HISTORY OF PRESENT ILLNESS:     Patient is a 44-year-old male with history of depression, hypertension, diabetes presenting for evaluation of 1 day of chest pain.  Patient reports a great deal of emotional  strain recently because he found out his 32-year-old sister  in her sleep suddenly currently unknown circumstances.  Patient reports upon getting out of bed this morning he has had a significant amount of abdominal pain, mostly periumbilical along with feelings of pressure in his abdomen and some along his chest.  Patient reports getting dizzy upon getting out of bed.  Patient now reports abdominal pain is primary symptom.  He denies any history of previous cardiac disease.  Diaphoresis noted earlier today.  No radiation of pain to jaw or down his arm.  Denies any constitutional infectious symptoms such as fevers, chills, fatigue.  No cough or sputum production.     Patient has first-generation family history of heart disease in his father who had his first cardiac intervention in his 50s for coronary artery disease.     In the emergency department patient is hemodynamically stable.  Labs significant for glucose 167, sodium of 132, potassium 4.1 however of note labs were hemolyzed uncertain of veracity.  White count 21.  Urine drug screen positive for opiates marijuana and benzodiazepines.  Initial troponins negative.  Emergency department concern for primary cardiac event.  EKG with significant artifact appears sinus no significant ST segment changes.  Patient had a CT of chest with contrast showing no PE or acute pulmonary findings.  Patient admitted for evaluation of abdominal pain.          ROS  All systems reviewed and negative except as stated below  10/  019  Complains of diarrhea about 6 times daily for the past 2 to 3 days.  Reports recent antibiotic use for ear infection and ruptured eardrum  No he denies any chest pain but has periumbilical abdominal pain.  CT scan reviewed.  Reports multiple family members with coronary artery disease and recent loss of his sister for unknown reason.    10/22/2019  Chief complaint of diarrhea about 10 times this morning.  Denies any bleeding.  Had stress test  "this morning and complained of postnasal discharge and runny nose.  We will add antihistamines and switch to Zithromax.  Pending C. difficile      No family history on file.    Past Medical History:   Diagnosis Date   • Depression    • Diabetes mellitus (CMS/HCC)    • Hyperlipidemia    • Hypertension        Social History     Socioeconomic History   • Marital status: Single     Spouse name: Not on file   • Number of children: Not on file   • Years of education: Not on file   • Highest education level: Not on file   Tobacco Use   • Smoking status: Never Smoker   Substance and Sexual Activity   • Alcohol use: No     Frequency: Never   • Drug use: No   • Sexual activity: No           Objective      Vital Signs  Temp:  [98 °F (36.7 °C)-98.6 °F (37 °C)] 98.6 °F (37 °C)  Heart Rate:  [] 101  Resp:  [12-18] 12  BP: (125-139)/(82-88) 139/88  Oxygen Therapy  SpO2: 100 %  Pulse Oximetry Type: Intermittent  Device (Oxygen Therapy): room air  Flowsheet Rows      First Filed Value   Admission Height  180.3 cm (71\") Documented at 10/20/2019 1249   Admission Weight  99.8 kg (220 lb) Documented at 10/20/2019 1249        Intake & Output (last 3 days)       10/19 0701 - 10/20 0700 10/20 0701 - 10/21 0700 10/21 0701 - 10/22 0700 10/22 0701 - 10/23 0700    P.O.   720     IV Piggyback   200     Total Intake(mL/kg)   920 (8.1)     Urine (mL/kg/hr)  650      Stool  0      Total Output  650      Net  -650 +920             Urine Unmeasured Occurrence  2 x 1 x     Stool Unmeasured Occurrence  1 x 6 x         Lines, Drains & Airways    Active LDAs     Name:   Placement date:   Placement time:   Site:   Days:    Peripheral IV 10/20/19 1253 Left Antecubital   10/20/19    1253    Antecubital   less than 1                  Physical Exam:    Physical Exam   Constitutional: He is oriented to person, place, and time. He appears well-developed and well-nourished. No distress.   HENT:   Head: Normocephalic and atraumatic.   Right Ear: External " ear normal.   Left Ear: External ear normal.   Nose: Nose normal.   Mouth/Throat: Oropharynx is clear and moist. No oropharyngeal exudate.   Eyes: Conjunctivae and EOM are normal. Pupils are equal, round, and reactive to light. Right eye exhibits no discharge. Left eye exhibits no discharge. No scleral icterus.   Neck: Normal range of motion. No JVD present. No tracheal deviation present. No thyromegaly present.   Cardiovascular: Normal rate, regular rhythm, normal heart sounds and intact distal pulses. Exam reveals no gallop and no friction rub.   No murmur heard.  Pulmonary/Chest: Effort normal and breath sounds normal. No stridor. No respiratory distress. He has no wheezes. He has no rales. He exhibits no tenderness.   Abdominal: Soft. Bowel sounds are normal. He exhibits no distension and no mass. There is no tenderness. There is no rebound and no guarding. No hernia.   Mild diffuse tenderness.  No rebound or rigidity.  Bowel sounds +2.   Musculoskeletal: Normal range of motion. He exhibits no edema, tenderness or deformity.   Lymphadenopathy:     He has no cervical adenopathy.   Neurological: He is alert and oriented to person, place, and time. No cranial nerve deficit or sensory deficit. He exhibits normal muscle tone. Coordination normal.   Patient reports short-term memory impairment secondary to his loss of his sister recently.  He has multiple family members with coronary artery disease     Skin: Skin is warm and dry. No rash noted. He is not diaphoretic. No erythema.   Psychiatric: He has a normal mood and affect. His behavior is normal.   Nursing note and vitals reviewed.        Procedures:              Results Review:     I reviewed the patient's new clinical results.      Results from last 7 days   Lab Units 10/22/19  0514 10/21/19  1009 10/20/19  1254   WBC 10*3/mm3 6.70 9.50 21.00*   HEMOGLOBIN g/dL 12.4* 12.6* 14.2   HEMATOCRIT % 36.3* 37.8 42.6   PLATELETS 10*3/mm3 294 300 362     Results from last  7 days   Lab Units 10/22/19  0419 10/21/19  1008 10/20/19  1254   SODIUM mmol/L 138 135* 132*   POTASSIUM mmol/L 3.7 3.5 4.1   CHLORIDE mmol/L 102 100 94*   CO2 mmol/L 26.0 24.0 18.0*   BUN mg/dL 6 7 8   CREATININE mg/dL 0.82 0.78 0.98   CALCIUM mg/dL 9.2 8.9 9.6   BILIRUBIN mg/dL 0.2 <0.2* 0.5   ALK PHOS U/L 77 77 96   ALT (SGPT) U/L 9 9 12   AST (SGOT) U/L 12 10 16   GLUCOSE mg/dL 140* 182* 167*         Lab Results   Component Value Date    CALCIUM 9.2 10/22/2019     Hemoglobin A1C   Date Value Ref Range Status   10/20/2019 6.1 (H) 3.5 - 5.6 % Final                   Microbiology Results (last 10 days)     Procedure Component Value - Date/Time    Gastrointestinal Panel, PCR - Stool, Per Rectum [[id number]]  (Abnormal) Collected:  10/21/19 1039    Lab Status:  Final result Specimen:  Stool from Per Rectum Updated:  10/21/19 1533     Campylobacter Detected     Plesiomonas shigelloides Not Detected     Salmonella Not Detected     Vibrio Not Detected     Vibrio cholerae Not Detected     Yersinia enterocolitica Not Detected     Enteroaggregative E. coli (EAEC) Not Detected     Enteropathogenic E. coli (EPEC) Detected     Enterotoxigenic E. coli (ETEC) lt/st Detected     Shiga-like toxin-producing E. coli (STEC) stx1/stx2 Not Detected     E. coli O157 Not Detected     Shigella/Enteroinvasive E. coli (EIEC) Not Detected     Cryptosporidium Not Detected     Cyclospora cayetanensis Not Detected     Entamoeba histolytica Not Detected     Giardia lamblia Not Detected     Adenovirus F40/41 Not Detected     Astrovirus Not Detected     Norovirus GI/GII Not Detected     Rotavirus A Not Detected     Sapovirus (I, II, IV or V) Not Detected          ECG/EMG Results (most recent)     Procedure Component Value Units Date/Time    ECG 12 Lead [[id number]] Collected:  10/20/19 1250     Updated:  10/21/19 0644    Narrative:       HEART RATE= 107  bpm  RR Interval= 560  ms  MD Interval= 150  ms  P Horizontal Axis= -3  deg  P Front Axis= 50   deg  QRSD Interval= 94  ms  QT Interval= 337  ms  QRS Axis= 9  deg  T Wave Axis= 27  deg  - BORDERLINE ECG -  Sinus tachycardia  Probable left atrial enlargement  Electronically Signed By: Mahamed Cardenas ([facility]) 21-Oct-2019 06:44:15  Date and Time of Study: 2019-10-20 12:52:00                    Ct Abdomen Pelvis Without Contrast    Result Date: 10/20/2019  Moderate diffuse cervical vertebral region of the wall of the cecum, ascending colon, and transverse colon with mild pericolonic fat stranding, indicating a nonspecific colitis, likely infectious/inflammatory. Ischemic colitis is not excluded but felt less likely given the distribution.  Electronically Signed By-Geovanny Cheema On:10/20/2019 6:37 PM This report was finalized on [id number] by  Geovanny Cheema .    Xr Chest 1 View    Result Date: 10/20/2019  Mildly limited study demonstrating low lung volumes with no active disease.  Electronically Signed By-Geovanny Cheema On:10/20/2019 1:23 PM This report was finalized on [id number] by  Geovanny Cheema, .    Ct Chest Pulmonary Embolism    Result Date: 10/20/2019   1. The study is negative for pulmonary embolism. No acute intrathoracic abnormality. 2. Low lung volumes with diffuse groundglass opacities throughout both lungs are likely represent atelectasis. 3. Partially included nonspecific thickening of the wall of the hepatic flexure. Evaluate for abdominal pain. Could consider correlation with colonoscopy if clinically indicated.  Electronically Signed ByAtif Cheema On:10/20/2019 3:30 PM This report was finalized on [id number] by  Geovanny Cheema .      Xrays, labs reviewed personally by physician.    Medication Review:   I have reviewed the patient's current medication list      Scheduled Meds    atenolol 25 mg Oral Daily   atorvastatin 20 mg Oral Daily   DULoxetine 60 mg Oral Daily   famotidine 20 mg Oral BID AC   insulin lispro 0-9 Units Subcutaneous 4x Daily With Meals & Nightly   PARoxetine 60 mg Oral QAM    piperacillin-tazobactam 3.375 g Intravenous Q8H   QUEtiapine 50 mg Oral BID   sodium chloride 10 mL Intravenous Q12H   sucralfate 1 g Oral 4x Daily AC & at Bedtime   vancomycin 125 mg Oral Q6H       Meds Infusions    lactated ringers 125 mL/hr Last Rate: Stopped (10/21/19 1056)   Pharmacy Consult     Pharmacy to Dose Zosyn         Meds PRN  •  acetaminophen **OR** acetaminophen **OR** acetaminophen  •  ALPRAZolam  •  calcium carbonate  •  cyclobenzaprine  •  dextrose  •  dextrose  •  glucagon (human recombinant)  •  HYDROcodone-acetaminophen  •  ketamine (KETALAR) infusion **AND** Ketamine Vital Signs & Assessment  •  ketorolac  •  magnesium sulfate **OR** magnesium sulfate **OR** magnesium sulfate  •  melatonin  •  ondansetron **OR** ondansetron  •  Pharmacy Consult  •  Pharmacy to Dose Zosyn  •  potassium chloride  •  potassium chloride  •  sodium chloride  •  sodium chloride      Assessment / Plan    Active Hospital Problems    Diagnosis  POA   • Chest pain [R07.9]  Yes      Resolved Hospital Problems   No resolved problems to display.       Patient is a 44-year-old male with history of hypertension, diabetes and hyperlipidemia presenting with abdominal pain and chest pressure appearing hemodynamically stable     Acute colitis likely infectious.  Rule out C. difficile given recent antibiotic use.  Check C. difficile  Start p.o. Vanco and IV Zosyn  Stool studies positive for Campylobacter/enterotoxigenic/enteropathogenic E. coli.  DC Zosyn  Start Zithromax  -IV hydration  -GI cocktail  -IV Protonix  -Sucralfate  -Antiemetics  -Lactate     Chest pressure -patient reports pain when he takes a deep breath.  Heart score of 3 low risk.  Patient does have family history and multiple risk factors including hypertension, diabetes and hyperlipidemia along with obesity.  However patient's primary concerns more abdominal related  -Received full dose aspirin in the ED  -Initial troponin negative.  Repeat  evaluation.  -Continue home statin  Patient has significant family history of coronary artery disease multiple family members.  Pending stress test results  Patient had a CT angiogram that was negative for PE       Hyponatremia-no signs of heart failure, may be ADH related stress response  -Trend daily     Polysubstance abuse -patient with prescribed benzos and opiates however U tox also positive for cannabis  -Recommend cessation     Hypertension - relatively controlled  Hold blood pressure medications given borderline low blood pressure.  Patient also has diarrhea and fluid losses.  May restart soon.     Type 2 diabetes -uncertain degree of control at home  -A1c  -Sliding scale     Depression -patient on multiple psychiatric meds.  Consult psychiatristAnxiety -she is on benzodiazepines, uncertain who is prescribing  -Resume for now     Chronic pain -on opiates for multiple years  -Resume while inpatient  -Ketamine as needed for pain     DVT prophylaxis -SCDs              Discharge Planning    Destination      No service coordination in this encounter.      Durable Medical Equipment      No service coordination in this encounter.      Dialysis/Infusion      No service coordination in this encounter.      Home Medical Care      No service coordination in this encounter.      Therapy      No service coordination in this encounter.      Community Resources      No service coordination in this encounter.          Som eWaver MD  10/22/19  4:20 PM        Electronically signed by Som Weaver MD at 10/22/19 6862     Som Weaver MD at 10/21/19 0906                [facility] Daily Progress Note      Hospitalist Team  LOS 0 days      Patient Care Team:  Venancio Hurley MD as PCP - General        Chief Complaint / Subjective  Chief Complaint   Patient presents with   • Chest Pain       Brief Synopsis of Hospital Course/HPI    HISTORY OF PRESENT  ILLNESS:     Patient is a 44-year-old male with history of depression, hypertension, diabetes presenting for evaluation of 1 day of chest pain.  Patient reports a great deal of emotional strain recently because he found out his 32-year-old sister  in her sleep suddenly currently unknown circumstances.  Patient reports upon getting out of bed this morning he has had a significant amount of abdominal pain, mostly periumbilical along with feelings of pressure in his abdomen and some along his chest.  Patient reports getting dizzy upon getting out of bed.  Patient now reports abdominal pain is primary symptom.  He denies any history of previous cardiac disease.  Diaphoresis noted earlier today.  No radiation of pain to jaw or down his arm.  Denies any constitutional infectious symptoms such as fevers, chills, fatigue.  No cough or sputum production.     Patient has first-generation family history of heart disease in his father who had his first cardiac intervention in his 50s for coronary artery disease.     In the emergency department patient is hemodynamically stable.  Labs significant for glucose 167, sodium of 132, potassium 4.1 however of note labs were hemolyzed uncertain of veracity.  White count 21.  Urine drug screen positive for opiates marijuana and benzodiazepines.  Initial troponins negative.  Emergency department concern for primary cardiac event.  EKG with significant artifact appears sinus no significant ST segment changes.  Patient had a CT of chest with contrast showing no PE or acute pulmonary findings.  Patient admitted for evaluation of abdominal pain.          ROS  All systems reviewed and negative except as stated below  10/1 2019  Complains of diarrhea about 6 times daily for the past 2 to 3 days.  Reports recent antibiotic use for ear infection and ruptured eardrum  No he denies any chest pain but has periumbilical abdominal pain.  CT scan reviewed.  Reports multiple family members with  "coronary artery disease and recent loss of his sister for unknown reason.    No family history on file.    Past Medical History:   Diagnosis Date   • Depression    • Diabetes mellitus (CMS/HCC)    • Hyperlipidemia    • Hypertension        Social History     Socioeconomic History   • Marital status: Single     Spouse name: Not on file   • Number of children: Not on file   • Years of education: Not on file   • Highest education level: Not on file   Tobacco Use   • Smoking status: Never Smoker   Substance and Sexual Activity   • Alcohol use: No     Frequency: Never   • Drug use: No   • Sexual activity: No           Objective      Vital Signs  Temp:  [97.8 °F (36.6 °C)-98.2 °F (36.8 °C)] 97.9 °F (36.6 °C)  Heart Rate:  [] 88  Resp:  [14-24] 17  BP: (105-151)/(70-83) 105/70  Oxygen Therapy  SpO2: 98 %  Pulse Oximetry Type: Intermittent  Device (Oxygen Therapy): room air  Flowsheet Rows      First Filed Value   Admission Height  180.3 cm (71\") Documented at 10/20/2019 1249   Admission Weight  99.8 kg (220 lb) Documented at 10/20/2019 1249        Intake & Output (last 3 days)       10/18 0701 - 10/19 0700 10/19 0701 - 10/20 0700 10/20 0701 - 10/21 0700 10/21 0701 - 10/22 0700    Urine (mL/kg/hr)   650     Stool   0     Total Output   650     Net   -650             Urine Unmeasured Occurrence   2 x 1 x    Stool Unmeasured Occurrence   1 x         Lines, Drains & Airways    Active LDAs     Name:   Placement date:   Placement time:   Site:   Days:    Peripheral IV 10/20/19 1253 Left Antecubital   10/20/19    1253    Antecubital   less than 1                  Physical Exam:    Physical Exam   Constitutional: He is oriented to person, place, and time. He appears well-developed and well-nourished. No distress.   HENT:   Head: Normocephalic and atraumatic.   Right Ear: External ear normal.   Left Ear: External ear normal.   Nose: Nose normal.   Mouth/Throat: Oropharynx is clear and moist. No oropharyngeal exudate.   Eyes: " Conjunctivae and EOM are normal. Pupils are equal, round, and reactive to light. Right eye exhibits no discharge. Left eye exhibits no discharge. No scleral icterus.   Neck: Normal range of motion. No JVD present. No tracheal deviation present. No thyromegaly present.   Cardiovascular: Normal rate, regular rhythm, normal heart sounds and intact distal pulses. Exam reveals no gallop and no friction rub.   No murmur heard.  Pulmonary/Chest: Effort normal and breath sounds normal. No stridor. No respiratory distress. He has no wheezes. He has no rales. He exhibits no tenderness.   Abdominal: Soft. Bowel sounds are normal. He exhibits no distension and no mass. There is no tenderness. There is no rebound and no guarding. No hernia.   Mild diffuse tenderness.  No rebound or rigidity.  Bowel sounds +2.   Musculoskeletal: Normal range of motion. He exhibits no edema, tenderness or deformity.   Lymphadenopathy:     He has no cervical adenopathy.   Neurological: He is alert and oriented to person, place, and time. No cranial nerve deficit or sensory deficit. He exhibits normal muscle tone. Coordination normal.   Patient reports short-term memory impairment secondary to his loss of his sister recently.  He has multiple family members with coronary artery disease     Skin: Skin is warm and dry. No rash noted. He is not diaphoretic. No erythema.   Psychiatric: He has a normal mood and affect. His behavior is normal.   Nursing note and vitals reviewed.        Procedures:              Results Review:     I reviewed the patient's new clinical results.      Results from last 7 days   Lab Units 10/20/19  1254   WBC 10*3/mm3 21.00*   HEMOGLOBIN g/dL 14.2   HEMATOCRIT % 42.6   PLATELETS 10*3/mm3 362     Results from last 7 days   Lab Units 10/20/19  1254   SODIUM mmol/L 132*   POTASSIUM mmol/L 4.1   CHLORIDE mmol/L 94*   CO2 mmol/L 18.0*   BUN mg/dL 8   CREATININE mg/dL 0.98   CALCIUM mg/dL 9.6   BILIRUBIN mg/dL 0.5   ALK PHOS U/L 96    ALT (SGPT) U/L 12   AST (SGOT) U/L 16   GLUCOSE mg/dL 167*         Lab Results   Component Value Date    CALCIUM 9.6 10/20/2019     No results found for: HGBA1C                Microbiology Results (last 10 days)     ** No results found for the last 240 hours. **          ECG/EMG Results (most recent)     Procedure Component Value Units Date/Time    ECG 12 Lead [[id number]] Collected:  10/20/19 1250     Updated:  10/21/19 0644    Narrative:       HEART RATE= 107  bpm  RR Interval= 560  ms  WV Interval= 150  ms  P Horizontal Axis= -3  deg  P Front Axis= 50  deg  QRSD Interval= 94  ms  QT Interval= 337  ms  QRS Axis= 9  deg  T Wave Axis= 27  deg  - BORDERLINE ECG -  Sinus tachycardia  Probable left atrial enlargement  Electronically Signed By: Mahamed Cardenas ([facility]) 21-Oct-2019 06:44:15  Date and Time of Study: 2019-10-20 12:52:00                    Ct Abdomen Pelvis Without Contrast    Result Date: 10/20/2019  Moderate diffuse cervical vertebral region of the wall of the cecum, ascending colon, and transverse colon with mild pericolonic fat stranding, indicating a nonspecific colitis, likely infectious/inflammatory. Ischemic colitis is not excluded but felt less likely given the distribution.  Electronically Signed By-Geovanny Cheema On:10/20/2019 6:37 PM This report was finalized on [id number] by  Geovanny Chemea, .    Xr Chest 1 View    Result Date: 10/20/2019  Mildly limited study demonstrating low lung volumes with no active disease.  Electronically Signed By-Geovanny Cheema On:10/20/2019 1:23 PM This report was finalized on [id number] by  Geovanny Cheema, .    Ct Chest Pulmonary Embolism    Result Date: 10/20/2019   1. The study is negative for pulmonary embolism. No acute intrathoracic abnormality. 2. Low lung volumes with diffuse groundglass opacities throughout both lungs are likely represent atelectasis. 3. Partially included nonspecific thickening of the wall of the hepatic flexure. Evaluate for abdominal pain.  Could consider correlation with colonoscopy if clinically indicated.  Electronically Signed By-Geovanny Cheema On:10/20/2019 3:30 PM This report was finalized on [id number] by  eGovanny Cheema, .      Xrays, labs reviewed personally by physician.    Medication Review:   I have reviewed the patient's current medication list      Scheduled Meds    atenolol 25 mg Oral Daily   atorvastatin 20 mg Oral Daily   DULoxetine 60 mg Oral Daily   insulin lispro 0-9 Units Subcutaneous 4x Daily With Meals & Nightly   pantoprazole 40 mg Intravenous Q AM   PARoxetine 60 mg Oral QAM   QUEtiapine 50 mg Oral BID   sodium chloride 10 mL Intravenous Q12H   sucralfate 1 g Oral 4x Daily AC & at Bedtime   vancomycin 125 mg Oral Q6H       Meds Infusions    lactated ringers 125 mL/hr Last Rate: 125 mL/hr (10/21/19 0854)   Pharmacy Consult     Pharmacy to Dose Zosyn         Meds PRN  •  acetaminophen **OR** acetaminophen **OR** acetaminophen  •  ALPRAZolam  •  calcium carbonate  •  cyclobenzaprine  •  dextrose  •  dextrose  •  glucagon (human recombinant)  •  HYDROcodone-acetaminophen  •  ketamine (KETALAR) infusion **AND** Ketamine Vital Signs & Assessment  •  ketorolac  •  magnesium sulfate **OR** magnesium sulfate **OR** magnesium sulfate  •  melatonin  •  ondansetron **OR** ondansetron  •  Pharmacy Consult  •  Pharmacy to Dose Zosyn  •  potassium chloride  •  potassium chloride  •  sodium chloride  •  sodium chloride      Assessment / Plan    Active Hospital Problems    Diagnosis  POA   • Chest pain [R07.9]  Yes      Resolved Hospital Problems   No resolved problems to display.       Patient is a 44-year-old male with history of hypertension, diabetes and hyperlipidemia presenting with abdominal pain and chest pressure appearing hemodynamically stable     Acute colitis likely infectious.  Rule out C. difficile given recent antibiotic use.  Check C. difficile  Start p.o. Vanco and IV Zosyn  Patient reported diffuse abdominal pain along with  diarrhea.  Also noted to have a white count.  Consider gastritis versus diverticulitis versus colitis versus pancreatitis.  -CT abdomen pelvis  -IV hydration  -GI cocktail  -IV Protonix  -Sucralfate  -Antiemetics  -Lactate     Chest pressure -patient reports pain when he takes a deep breath.  Heart score of 3 low risk.  Patient does have family history and multiple risk factors including hypertension, diabetes and hyperlipidemia along with obesity.  However patient's primary concerns more abdominal related  -Received full dose aspirin in the ED  -Initial troponin negative.  Repeat evaluation.  -Continue home statin  Patient has significant family history of coronary artery disease multiple family members.  Will check stress test       Hyponatremia-no signs of heart failure, may be ADH related stress response  -Trend daily     Polysubstance abuse -patient with prescribed benzos and opiates however U tox also positive for cannabis  -Recommend cessation     Hypertension - relatively controlled  Hold blood pressure medications given borderline low blood pressure.  Patient also has diarrhea and fluid losses.  May restart soon.     Type 2 diabetes -uncertain degree of control at home  -A1c  -Sliding scale     Depression -patient on multiple psychiatric meds.  Patient very tearful and burst out crying multiple times during exam  -If persists recommend psych eval  -Resume home meds     Anxiety -she is on benzodiazepines, uncertain who is prescribing  -Resume for now     Chronic pain -on opiates for multiple years  -Resume while inpatient  -Ketamine as needed for pain     DVT prophylaxis -SCDs              Discharge Planning    Destination      No service coordination in this encounter.      Durable Medical Equipment      No service coordination in this encounter.      Dialysis/Infusion      No service coordination in this encounter.      Home Medical Care      No service coordination in this encounter.      Therapy      No  service coordination in this encounter.      [facility]      No service coordination in this encounter.          Som Weaver MD  10/21/19  9:32 AM        Electronically signed by Som Weaver MD at 10/21/19 1045       Consult Notes (last 72 hours) (Notes from 10/20/19 1211 through 10/23/19 1211)     No notes of this type exist for this encounter.

## 2019-10-24 ENCOUNTER — READMISSION MANAGEMENT (OUTPATIENT)
Dept: CALL CENTER | Facility: HOSPITAL | Age: 44
End: 2019-10-24

## 2019-10-24 NOTE — OUTREACH NOTE
Prep Survey      Responses   Facility patient discharged from?  [facility]   Is patient eligible?  Yes   Discharge diagnosis  Chest pain   Does the patient have one of the following disease processes/diagnoses(primary or secondary)?  Other   Does the patient have Home health ordered?  No   Is there a DME ordered?  No   Medication alerts for this patient  See AVS   Prep survey completed?  Yes          Liz Valladares LPN

## 2019-10-25 ENCOUNTER — READMISSION MANAGEMENT (OUTPATIENT)
Dept: CALL CENTER | Facility: HOSPITAL | Age: 44
End: 2019-10-25

## 2019-10-25 ENCOUNTER — OFFICE (OUTPATIENT)
Dept: URBAN - METROPOLITAN AREA CLINIC 64 | Facility: CLINIC | Age: 44
End: 2019-10-25

## 2019-10-25 ENCOUNTER — HOSPITAL ENCOUNTER (OUTPATIENT)
Facility: HOSPITAL | Age: 44
Setting detail: HOSPITAL OUTPATIENT SURGERY
End: 2019-10-25
Attending: INTERNAL MEDICINE | Admitting: INTERNAL MEDICINE

## 2019-10-25 VITALS
DIASTOLIC BLOOD PRESSURE: 87 MMHG | HEART RATE: 124 BPM | WEIGHT: 243 LBS | SYSTOLIC BLOOD PRESSURE: 154 MMHG | HEIGHT: 71 IN

## 2019-10-25 DIAGNOSIS — R11.2 NAUSEA WITH VOMITING, UNSPECIFIED: ICD-10-CM

## 2019-10-25 DIAGNOSIS — R10.84 GENERALIZED ABDOMINAL PAIN: ICD-10-CM

## 2019-10-25 DIAGNOSIS — K92.1 MELENA: ICD-10-CM

## 2019-10-25 DIAGNOSIS — K52.9 NONINFECTIVE GASTROENTERITIS AND COLITIS, UNSPECIFIED: ICD-10-CM

## 2019-10-25 PROCEDURE — 99204 OFFICE O/P NEW MOD 45 MIN: CPT | Performed by: INTERNAL MEDICINE

## 2019-10-25 RX ORDER — DICYCLOMINE HYDROCHLORIDE 20 MG/1
80 TABLET ORAL
Qty: 120 | Refills: 6 | Status: ACTIVE
Start: 2019-10-25

## 2019-10-25 NOTE — OUTREACH NOTE
Medical Week 1 Survey      Responses   Facility patient discharged from?  [facility]   Does the patient have one of the following disease processes/diagnoses(primary or secondary)?  Other   Is there a successful TCM telephone encounter documented?  No   Week 1 attempt successful?  No   Revoke  Decline to participate [No answer/voicemail not set up]          Liz Valladares LPN

## 2020-07-11 ENCOUNTER — TRANSCRIBE ORDERS (OUTPATIENT)
Dept: ADMINISTRATIVE | Facility: HOSPITAL | Age: 45
End: 2020-07-11

## 2020-07-11 ENCOUNTER — HOSPITAL ENCOUNTER (OUTPATIENT)
Dept: GENERAL RADIOLOGY | Facility: HOSPITAL | Age: 45
Discharge: HOME | End: 2020-07-11

## 2020-07-11 ENCOUNTER — HOSPITAL ENCOUNTER (OUTPATIENT)
Dept: GENERAL RADIOLOGY | Facility: HOSPITAL | Age: 45
Discharge: HOME | End: 2020-07-11
Admitting: NEUROLOGICAL SURGERY

## 2020-07-11 ENCOUNTER — LAB (OUTPATIENT)
Dept: LAB | Facility: HOSPITAL | Age: 45
End: 2020-07-11

## 2020-07-11 DIAGNOSIS — E11.69 OBESITY, DIABETES, AND HYPERTENSION SYNDROME (HCC): ICD-10-CM

## 2020-07-11 DIAGNOSIS — M54.2 NECK PAIN: ICD-10-CM

## 2020-07-11 DIAGNOSIS — E11.59 OBESITY, DIABETES, AND HYPERTENSION SYNDROME (HCC): ICD-10-CM

## 2020-07-11 DIAGNOSIS — E78.5 HYPERLIPIDEMIA, UNSPECIFIED HYPERLIPIDEMIA TYPE: ICD-10-CM

## 2020-07-11 DIAGNOSIS — I10 HYPERTENSION, ESSENTIAL: ICD-10-CM

## 2020-07-11 DIAGNOSIS — E66.9 OBESITY, DIABETES, AND HYPERTENSION SYNDROME (HCC): ICD-10-CM

## 2020-07-11 DIAGNOSIS — M54.2 NECK PAIN: Primary | ICD-10-CM

## 2020-07-11 DIAGNOSIS — I15.2 OBESITY, DIABETES, AND HYPERTENSION SYNDROME (HCC): ICD-10-CM

## 2020-07-11 DIAGNOSIS — I10 HYPERTENSION, ESSENTIAL: Primary | ICD-10-CM

## 2020-07-11 LAB
ALBUMIN SERPL-MCNC: 4.2 G/DL (ref 3.5–5.2)
ALBUMIN/GLOB SERPL: 1.3 G/DL
ALP SERPL-CCNC: 80 U/L (ref 39–117)
ALT SERPL W P-5'-P-CCNC: 13 U/L (ref 1–41)
ANION GAP SERPL CALCULATED.3IONS-SCNC: 11.5 MMOL/L (ref 5–15)
AST SERPL-CCNC: 9 U/L (ref 1–40)
BASOPHILS # BLD AUTO: 0.07 10*3/MM3 (ref 0–0.2)
BASOPHILS NFR BLD AUTO: 0.7 % (ref 0–1.5)
BILIRUB SERPL-MCNC: 0.3 MG/DL (ref 0–1.2)
BUN SERPL-MCNC: 14 MG/DL (ref 6–20)
BUN/CREAT SERPL: 14.3 (ref 7–25)
CALCIUM SPEC-SCNC: 10.1 MG/DL (ref 8.6–10.5)
CHLORIDE SERPL-SCNC: 104 MMOL/L (ref 98–107)
CHOLEST SERPL-MCNC: 161 MG/DL (ref 0–200)
CO2 SERPL-SCNC: 24.5 MMOL/L (ref 22–29)
CREAT SERPL-MCNC: 0.98 MG/DL (ref 0.76–1.27)
DEPRECATED RDW RBC AUTO: 41.5 FL (ref 37–54)
EOSINOPHIL # BLD AUTO: 0.09 10*3/MM3 (ref 0–0.4)
EOSINOPHIL NFR BLD AUTO: 0.8 % (ref 0.3–6.2)
ERYTHROCYTE [DISTWIDTH] IN BLOOD BY AUTOMATED COUNT: 12.5 % (ref 12.3–15.4)
GFR SERPL CREATININE-BSD FRML MDRD: 84 ML/MIN/1.73
GLOBULIN UR ELPH-MCNC: 3.2 GM/DL
GLUCOSE SERPL-MCNC: 133 MG/DL (ref 65–99)
HCT VFR BLD AUTO: 38.7 % (ref 37.5–51)
HDLC SERPL-MCNC: 35 MG/DL (ref 40–60)
HGB BLD-MCNC: 13.1 G/DL (ref 13–17.7)
IMM GRANULOCYTES # BLD AUTO: 0.04 10*3/MM3 (ref 0–0.05)
IMM GRANULOCYTES NFR BLD AUTO: 0.4 % (ref 0–0.5)
LDLC SERPL CALC-MCNC: 91 MG/DL (ref 0–100)
LDLC/HDLC SERPL: 2.59 {RATIO}
LYMPHOCYTES # BLD AUTO: 3.02 10*3/MM3 (ref 0.7–3.1)
LYMPHOCYTES NFR BLD AUTO: 28.4 % (ref 19.6–45.3)
MCH RBC QN AUTO: 30.3 PG (ref 26.6–33)
MCHC RBC AUTO-ENTMCNC: 33.9 G/DL (ref 31.5–35.7)
MCV RBC AUTO: 89.6 FL (ref 79–97)
MONOCYTES # BLD AUTO: 0.76 10*3/MM3 (ref 0.1–0.9)
MONOCYTES NFR BLD AUTO: 7.2 % (ref 5–12)
NEUTROPHILS NFR BLD AUTO: 6.64 10*3/MM3 (ref 1.7–7)
NEUTROPHILS NFR BLD AUTO: 62.5 % (ref 42.7–76)
NRBC BLD AUTO-RTO: 0 /100 WBC (ref 0–0.2)
PLATELET # BLD AUTO: 447 10*3/MM3 (ref 140–450)
PMV BLD AUTO: 9.8 FL (ref 6–12)
POTASSIUM SERPL-SCNC: 5 MMOL/L (ref 3.5–5.2)
PROT SERPL-MCNC: 7.4 G/DL (ref 6–8.5)
RBC # BLD AUTO: 4.32 10*6/MM3 (ref 4.14–5.8)
SODIUM SERPL-SCNC: 140 MMOL/L (ref 136–145)
TRIGL SERPL-MCNC: 177 MG/DL (ref 0–150)
VLDLC SERPL-MCNC: 35.4 MG/DL (ref 5–40)
WBC # BLD AUTO: 10.62 10*3/MM3 (ref 3.4–10.8)

## 2020-07-11 PROCEDURE — 72114 X-RAY EXAM L-S SPINE BENDING: CPT

## 2020-07-11 PROCEDURE — 36415 COLL VENOUS BLD VENIPUNCTURE: CPT

## 2020-07-11 PROCEDURE — 80053 COMPREHEN METABOLIC PANEL: CPT

## 2020-07-11 PROCEDURE — 80061 LIPID PANEL: CPT

## 2020-07-11 PROCEDURE — 83036 HEMOGLOBIN GLYCOSYLATED A1C: CPT

## 2020-07-11 PROCEDURE — 72052 X-RAY EXAM NECK SPINE 6/>VWS: CPT

## 2020-07-11 PROCEDURE — 85025 COMPLETE CBC W/AUTO DIFF WBC: CPT

## 2020-07-13 LAB — HBA1C MFR BLD: 5.9 % (ref 3.5–5.6)

## 2021-07-23 ENCOUNTER — LAB (OUTPATIENT)
Dept: LAB | Facility: HOSPITAL | Age: 46
End: 2021-07-23

## 2021-07-23 ENCOUNTER — TRANSCRIBE ORDERS (OUTPATIENT)
Dept: ADMINISTRATIVE | Facility: HOSPITAL | Age: 46
End: 2021-07-23

## 2021-07-23 DIAGNOSIS — E11.69 DIABETES MELLITUS ASSOCIATED WITH HORMONAL ETIOLOGY (HCC): ICD-10-CM

## 2021-07-23 DIAGNOSIS — I10 BENIGN HYPERTENSION: Primary | ICD-10-CM

## 2021-07-23 DIAGNOSIS — I10 BENIGN HYPERTENSION: ICD-10-CM

## 2021-07-23 DIAGNOSIS — E78.2 MIXED HYPERLIPIDEMIA: ICD-10-CM

## 2021-07-23 LAB
ALBUMIN SERPL-MCNC: 4.4 G/DL (ref 3.5–5.2)
ALBUMIN/GLOB SERPL: 1.6 G/DL
ALP SERPL-CCNC: 101 U/L (ref 39–117)
ALT SERPL W P-5'-P-CCNC: 12 U/L (ref 1–41)
ANION GAP SERPL CALCULATED.3IONS-SCNC: 12.2 MMOL/L (ref 5–15)
AST SERPL-CCNC: 15 U/L (ref 1–40)
BASOPHILS # BLD AUTO: 0.06 10*3/MM3 (ref 0–0.2)
BASOPHILS NFR BLD AUTO: 0.7 % (ref 0–1.5)
BILIRUB SERPL-MCNC: 0.3 MG/DL (ref 0–1.2)
BUN SERPL-MCNC: 13 MG/DL (ref 6–20)
BUN/CREAT SERPL: 13.4 (ref 7–25)
CALCIUM SPEC-SCNC: 9.4 MG/DL (ref 8.6–10.5)
CHLORIDE SERPL-SCNC: 101 MMOL/L (ref 98–107)
CHOLEST SERPL-MCNC: 172 MG/DL (ref 0–200)
CO2 SERPL-SCNC: 23.8 MMOL/L (ref 22–29)
CREAT SERPL-MCNC: 0.97 MG/DL (ref 0.76–1.27)
DEPRECATED RDW RBC AUTO: 41.4 FL (ref 37–54)
EOSINOPHIL # BLD AUTO: 0.12 10*3/MM3 (ref 0–0.4)
EOSINOPHIL NFR BLD AUTO: 1.4 % (ref 0.3–6.2)
ERYTHROCYTE [DISTWIDTH] IN BLOOD BY AUTOMATED COUNT: 12.6 % (ref 12.3–15.4)
GFR SERPL CREATININE-BSD FRML MDRD: 84 ML/MIN/1.73
GLOBULIN UR ELPH-MCNC: 2.8 GM/DL
GLUCOSE SERPL-MCNC: 119 MG/DL (ref 65–99)
HBA1C MFR BLD: 6.2 % (ref 3.5–5.6)
HCT VFR BLD AUTO: 42.1 % (ref 37.5–51)
HDLC SERPL-MCNC: 33 MG/DL (ref 40–60)
HGB BLD-MCNC: 14.2 G/DL (ref 13–17.7)
IMM GRANULOCYTES # BLD AUTO: 0.04 10*3/MM3 (ref 0–0.05)
IMM GRANULOCYTES NFR BLD AUTO: 0.5 % (ref 0–0.5)
LDLC SERPL CALC-MCNC: 97 MG/DL (ref 0–100)
LDLC/HDLC SERPL: 2.72 {RATIO}
LYMPHOCYTES # BLD AUTO: 3.15 10*3/MM3 (ref 0.7–3.1)
LYMPHOCYTES NFR BLD AUTO: 35.8 % (ref 19.6–45.3)
MCH RBC QN AUTO: 30 PG (ref 26.6–33)
MCHC RBC AUTO-ENTMCNC: 33.7 G/DL (ref 31.5–35.7)
MCV RBC AUTO: 88.8 FL (ref 79–97)
MONOCYTES # BLD AUTO: 0.71 10*3/MM3 (ref 0.1–0.9)
MONOCYTES NFR BLD AUTO: 8.1 % (ref 5–12)
NEUTROPHILS NFR BLD AUTO: 4.72 10*3/MM3 (ref 1.7–7)
NEUTROPHILS NFR BLD AUTO: 53.5 % (ref 42.7–76)
NRBC BLD AUTO-RTO: 0 /100 WBC (ref 0–0.2)
PLATELET # BLD AUTO: 488 10*3/MM3 (ref 140–450)
PMV BLD AUTO: 9.5 FL (ref 6–12)
POTASSIUM SERPL-SCNC: 4.6 MMOL/L (ref 3.5–5.2)
PROT SERPL-MCNC: 7.2 G/DL (ref 6–8.5)
RBC # BLD AUTO: 4.74 10*6/MM3 (ref 4.14–5.8)
SODIUM SERPL-SCNC: 137 MMOL/L (ref 136–145)
TRIGL SERPL-MCNC: 247 MG/DL (ref 0–150)
VLDLC SERPL-MCNC: 42 MG/DL (ref 5–40)
WBC # BLD AUTO: 8.8 10*3/MM3 (ref 3.4–10.8)

## 2021-07-23 PROCEDURE — 80053 COMPREHEN METABOLIC PANEL: CPT

## 2021-07-23 PROCEDURE — 80061 LIPID PANEL: CPT

## 2021-07-23 PROCEDURE — 85025 COMPLETE CBC W/AUTO DIFF WBC: CPT

## 2021-07-23 PROCEDURE — 83036 HEMOGLOBIN GLYCOSYLATED A1C: CPT

## 2021-07-23 PROCEDURE — 36415 COLL VENOUS BLD VENIPUNCTURE: CPT

## 2021-10-29 ENCOUNTER — TRANSCRIBE ORDERS (OUTPATIENT)
Dept: ADMINISTRATIVE | Facility: HOSPITAL | Age: 46
End: 2021-10-29

## 2021-10-29 DIAGNOSIS — M54.50 LOW BACK PAIN, UNSPECIFIED BACK PAIN LATERALITY, UNSPECIFIED CHRONICITY, UNSPECIFIED WHETHER SCIATICA PRESENT: Primary | ICD-10-CM

## 2021-12-28 ENCOUNTER — LAB (OUTPATIENT)
Dept: LAB | Facility: HOSPITAL | Age: 46
End: 2021-12-28

## 2021-12-28 ENCOUNTER — TRANSCRIBE ORDERS (OUTPATIENT)
Dept: ADMINISTRATIVE | Facility: HOSPITAL | Age: 46
End: 2021-12-28

## 2021-12-28 DIAGNOSIS — Z12.5 ENCOUNTER FOR PROSTATE CANCER SCREENING: ICD-10-CM

## 2021-12-28 DIAGNOSIS — E78.2 MIXED HYPERLIPIDEMIA DUE TO TYPE 2 DIABETES MELLITUS: ICD-10-CM

## 2021-12-28 DIAGNOSIS — I10 HYPERTENSION, MALIGNANT: Primary | ICD-10-CM

## 2021-12-28 DIAGNOSIS — E11.69 MIXED HYPERLIPIDEMIA DUE TO TYPE 2 DIABETES MELLITUS: ICD-10-CM

## 2021-12-28 DIAGNOSIS — E78.2 MIXED HYPERLIPIDEMIA: ICD-10-CM

## 2021-12-28 DIAGNOSIS — I10 HYPERTENSION, MALIGNANT: ICD-10-CM

## 2021-12-28 LAB
ALBUMIN SERPL-MCNC: 4.2 G/DL (ref 3.5–5.2)
ALBUMIN UR-MCNC: 3.2 MG/DL
ALBUMIN/GLOB SERPL: 1.4 G/DL
ALP SERPL-CCNC: 79 U/L (ref 39–117)
ALT SERPL W P-5'-P-CCNC: 11 U/L (ref 1–41)
ANION GAP SERPL CALCULATED.3IONS-SCNC: 8.3 MMOL/L (ref 5–15)
AST SERPL-CCNC: 19 U/L (ref 1–40)
BASOPHILS # BLD AUTO: 0.05 10*3/MM3 (ref 0–0.2)
BASOPHILS NFR BLD AUTO: 0.7 % (ref 0–1.5)
BILIRUB SERPL-MCNC: 0.3 MG/DL (ref 0–1.2)
BUN SERPL-MCNC: 11 MG/DL (ref 6–20)
BUN/CREAT SERPL: 9 (ref 7–25)
CALCIUM SPEC-SCNC: 9.6 MG/DL (ref 8.6–10.5)
CHLORIDE SERPL-SCNC: 105 MMOL/L (ref 98–107)
CHOLEST SERPL-MCNC: 145 MG/DL (ref 0–200)
CO2 SERPL-SCNC: 26.7 MMOL/L (ref 22–29)
CREAT SERPL-MCNC: 1.22 MG/DL (ref 0.76–1.27)
CREAT UR-MCNC: 677.8 MG/DL
DEPRECATED RDW RBC AUTO: 40.6 FL (ref 37–54)
EOSINOPHIL # BLD AUTO: 0.11 10*3/MM3 (ref 0–0.4)
EOSINOPHIL NFR BLD AUTO: 1.4 % (ref 0.3–6.2)
ERYTHROCYTE [DISTWIDTH] IN BLOOD BY AUTOMATED COUNT: 12.3 % (ref 12.3–15.4)
GFR SERPL CREATININE-BSD FRML MDRD: 65 ML/MIN/1.73
GLOBULIN UR ELPH-MCNC: 2.9 GM/DL
GLUCOSE SERPL-MCNC: 88 MG/DL (ref 65–99)
HBA1C MFR BLD: 5.8 % (ref 3.5–5.6)
HCT VFR BLD AUTO: 37.5 % (ref 37.5–51)
HDLC SERPL-MCNC: 33 MG/DL (ref 40–60)
HGB BLD-MCNC: 12.2 G/DL (ref 13–17.7)
IMM GRANULOCYTES # BLD AUTO: 0.02 10*3/MM3 (ref 0–0.05)
IMM GRANULOCYTES NFR BLD AUTO: 0.3 % (ref 0–0.5)
LDLC SERPL CALC-MCNC: 88 MG/DL (ref 0–100)
LDLC/HDLC SERPL: 2.59 {RATIO}
LYMPHOCYTES # BLD AUTO: 2.8 10*3/MM3 (ref 0.7–3.1)
LYMPHOCYTES NFR BLD AUTO: 36.8 % (ref 19.6–45.3)
MCH RBC QN AUTO: 29.3 PG (ref 26.6–33)
MCHC RBC AUTO-ENTMCNC: 32.5 G/DL (ref 31.5–35.7)
MCV RBC AUTO: 90.1 FL (ref 79–97)
MICROALBUMIN/CREAT UR: 4.7 MG/G
MONOCYTES # BLD AUTO: 0.49 10*3/MM3 (ref 0.1–0.9)
MONOCYTES NFR BLD AUTO: 6.4 % (ref 5–12)
NEUTROPHILS NFR BLD AUTO: 4.13 10*3/MM3 (ref 1.7–7)
NEUTROPHILS NFR BLD AUTO: 54.4 % (ref 42.7–76)
NRBC BLD AUTO-RTO: 0 /100 WBC (ref 0–0.2)
PLATELET # BLD AUTO: 391 10*3/MM3 (ref 140–450)
PMV BLD AUTO: 9.8 FL (ref 6–12)
POTASSIUM SERPL-SCNC: 5 MMOL/L (ref 3.5–5.2)
PROT SERPL-MCNC: 7.1 G/DL (ref 6–8.5)
PSA SERPL-MCNC: 0.32 NG/ML (ref 0–4)
RBC # BLD AUTO: 4.16 10*6/MM3 (ref 4.14–5.8)
SODIUM SERPL-SCNC: 140 MMOL/L (ref 136–145)
TRIGL SERPL-MCNC: 132 MG/DL (ref 0–150)
VLDLC SERPL-MCNC: 24 MG/DL (ref 5–40)
WBC NRBC COR # BLD: 7.6 10*3/MM3 (ref 3.4–10.8)

## 2021-12-28 PROCEDURE — 80053 COMPREHEN METABOLIC PANEL: CPT

## 2021-12-28 PROCEDURE — 36415 COLL VENOUS BLD VENIPUNCTURE: CPT

## 2021-12-28 PROCEDURE — 80061 LIPID PANEL: CPT

## 2021-12-28 PROCEDURE — 83036 HEMOGLOBIN GLYCOSYLATED A1C: CPT

## 2021-12-28 PROCEDURE — 85025 COMPLETE CBC W/AUTO DIFF WBC: CPT

## 2021-12-28 PROCEDURE — 82570 ASSAY OF URINE CREATININE: CPT

## 2021-12-28 PROCEDURE — G0103 PSA SCREENING: HCPCS

## 2021-12-28 PROCEDURE — 82043 UR ALBUMIN QUANTITATIVE: CPT

## 2022-05-21 ENCOUNTER — LAB (OUTPATIENT)
Dept: LAB | Facility: HOSPITAL | Age: 47
End: 2022-05-21

## 2022-05-21 ENCOUNTER — TRANSCRIBE ORDERS (OUTPATIENT)
Dept: ADMINISTRATIVE | Facility: HOSPITAL | Age: 47
End: 2022-05-21

## 2022-05-21 DIAGNOSIS — E11.69 DIABETES MELLITUS ASSOCIATED WITH HORMONAL ETIOLOGY: ICD-10-CM

## 2022-05-21 DIAGNOSIS — I10 ESSENTIAL HYPERTENSION, MALIGNANT: Primary | ICD-10-CM

## 2022-05-21 DIAGNOSIS — E78.2 MIXED HYPERLIPIDEMIA: ICD-10-CM

## 2022-05-21 DIAGNOSIS — I10 ESSENTIAL HYPERTENSION, MALIGNANT: ICD-10-CM

## 2022-05-21 LAB
ALBUMIN SERPL-MCNC: 4.2 G/DL (ref 3.5–5.2)
ALBUMIN/GLOB SERPL: 1.4 G/DL
ALP SERPL-CCNC: 86 U/L (ref 39–117)
ALT SERPL W P-5'-P-CCNC: 10 U/L (ref 1–41)
ANION GAP SERPL CALCULATED.3IONS-SCNC: 9.4 MMOL/L (ref 5–15)
AST SERPL-CCNC: 12 U/L (ref 1–40)
BASOPHILS # BLD AUTO: 0.04 10*3/MM3 (ref 0–0.2)
BASOPHILS NFR BLD AUTO: 0.5 % (ref 0–1.5)
BILIRUB SERPL-MCNC: 0.2 MG/DL (ref 0–1.2)
BUN SERPL-MCNC: 15 MG/DL (ref 6–20)
BUN/CREAT SERPL: 13.3 (ref 7–25)
CALCIUM SPEC-SCNC: 9.8 MG/DL (ref 8.6–10.5)
CHLORIDE SERPL-SCNC: 105 MMOL/L (ref 98–107)
CHOLEST SERPL-MCNC: 148 MG/DL (ref 0–200)
CO2 SERPL-SCNC: 24.6 MMOL/L (ref 22–29)
CREAT SERPL-MCNC: 1.13 MG/DL (ref 0.76–1.27)
DEPRECATED RDW RBC AUTO: 40.9 FL (ref 37–54)
EGFRCR SERPLBLD CKD-EPI 2021: 82.2 ML/MIN/1.73
EOSINOPHIL # BLD AUTO: 0.06 10*3/MM3 (ref 0–0.4)
EOSINOPHIL NFR BLD AUTO: 0.7 % (ref 0.3–6.2)
ERYTHROCYTE [DISTWIDTH] IN BLOOD BY AUTOMATED COUNT: 12.4 % (ref 12.3–15.4)
GLOBULIN UR ELPH-MCNC: 3.1 GM/DL
GLUCOSE SERPL-MCNC: 69 MG/DL (ref 65–99)
HCT VFR BLD AUTO: 39.3 % (ref 37.5–51)
HDLC SERPL-MCNC: 36 MG/DL (ref 40–60)
HGB BLD-MCNC: 12.6 G/DL (ref 13–17.7)
IMM GRANULOCYTES # BLD AUTO: 0.02 10*3/MM3 (ref 0–0.05)
IMM GRANULOCYTES NFR BLD AUTO: 0.2 % (ref 0–0.5)
LDLC SERPL CALC-MCNC: 84 MG/DL (ref 0–100)
LDLC/HDLC SERPL: 2.21 {RATIO}
LYMPHOCYTES # BLD AUTO: 2.87 10*3/MM3 (ref 0.7–3.1)
LYMPHOCYTES NFR BLD AUTO: 33.8 % (ref 19.6–45.3)
MCH RBC QN AUTO: 29.1 PG (ref 26.6–33)
MCHC RBC AUTO-ENTMCNC: 32.1 G/DL (ref 31.5–35.7)
MCV RBC AUTO: 90.8 FL (ref 79–97)
MONOCYTES # BLD AUTO: 0.65 10*3/MM3 (ref 0.1–0.9)
MONOCYTES NFR BLD AUTO: 7.7 % (ref 5–12)
NEUTROPHILS NFR BLD AUTO: 4.84 10*3/MM3 (ref 1.7–7)
NEUTROPHILS NFR BLD AUTO: 57.1 % (ref 42.7–76)
NRBC BLD AUTO-RTO: 0 /100 WBC (ref 0–0.2)
PLATELET # BLD AUTO: 366 10*3/MM3 (ref 140–450)
PMV BLD AUTO: 9.5 FL (ref 6–12)
POTASSIUM SERPL-SCNC: 5.2 MMOL/L (ref 3.5–5.2)
PROT SERPL-MCNC: 7.3 G/DL (ref 6–8.5)
RBC # BLD AUTO: 4.33 10*6/MM3 (ref 4.14–5.8)
SODIUM SERPL-SCNC: 139 MMOL/L (ref 136–145)
TRIGL SERPL-MCNC: 162 MG/DL (ref 0–150)
VLDLC SERPL-MCNC: 28 MG/DL (ref 5–40)
WBC NRBC COR # BLD: 8.48 10*3/MM3 (ref 3.4–10.8)

## 2022-05-21 PROCEDURE — 80061 LIPID PANEL: CPT

## 2022-05-21 PROCEDURE — 36415 COLL VENOUS BLD VENIPUNCTURE: CPT

## 2022-05-21 PROCEDURE — 83036 HEMOGLOBIN GLYCOSYLATED A1C: CPT

## 2022-05-21 PROCEDURE — 80053 COMPREHEN METABOLIC PANEL: CPT

## 2022-05-21 PROCEDURE — 85025 COMPLETE CBC W/AUTO DIFF WBC: CPT

## 2022-05-24 LAB — HBA1C MFR BLD: 5.9 % (ref 3.5–5.6)

## 2023-01-20 ENCOUNTER — TRANSCRIBE ORDERS (OUTPATIENT)
Dept: ADMINISTRATIVE | Facility: HOSPITAL | Age: 48
End: 2023-01-20
Payer: COMMERCIAL

## 2023-01-20 ENCOUNTER — LAB (OUTPATIENT)
Dept: LAB | Facility: HOSPITAL | Age: 48
End: 2023-01-20
Payer: COMMERCIAL

## 2023-01-20 DIAGNOSIS — E11.69 DIABETES MELLITUS ASSOCIATED WITH HORMONAL ETIOLOGY: ICD-10-CM

## 2023-01-20 DIAGNOSIS — E78.5 HYPERLIPIDEMIA, UNSPECIFIED HYPERLIPIDEMIA TYPE: ICD-10-CM

## 2023-01-20 DIAGNOSIS — I10 ESSENTIAL HYPERTENSION, MALIGNANT: Primary | ICD-10-CM

## 2023-01-20 DIAGNOSIS — Z12.5 SPECIAL SCREENING FOR MALIGNANT NEOPLASM OF PROSTATE: ICD-10-CM

## 2023-01-20 DIAGNOSIS — I10 ESSENTIAL HYPERTENSION, MALIGNANT: ICD-10-CM

## 2023-01-20 LAB
ALBUMIN SERPL-MCNC: 4.1 G/DL (ref 3.5–5.2)
ALBUMIN UR-MCNC: 2.4 MG/DL
ALBUMIN/GLOB SERPL: 1.5 G/DL
ALP SERPL-CCNC: 93 U/L (ref 39–117)
ALT SERPL W P-5'-P-CCNC: 8 U/L (ref 1–41)
ANION GAP SERPL CALCULATED.3IONS-SCNC: 9.1 MMOL/L (ref 5–15)
AST SERPL-CCNC: 9 U/L (ref 1–40)
BASOPHILS # BLD AUTO: 0.02 10*3/MM3 (ref 0–0.2)
BASOPHILS NFR BLD AUTO: 0.4 % (ref 0–1.5)
BILIRUB SERPL-MCNC: 0.2 MG/DL (ref 0–1.2)
BUN SERPL-MCNC: 14 MG/DL (ref 6–20)
BUN/CREAT SERPL: 11.2 (ref 7–25)
CALCIUM SPEC-SCNC: 9.3 MG/DL (ref 8.6–10.5)
CHLORIDE SERPL-SCNC: 103 MMOL/L (ref 98–107)
CHOLEST SERPL-MCNC: 128 MG/DL (ref 0–200)
CO2 SERPL-SCNC: 24.9 MMOL/L (ref 22–29)
CREAT SERPL-MCNC: 1.25 MG/DL (ref 0.76–1.27)
CREAT UR-MCNC: 488.8 MG/DL
DEPRECATED RDW RBC AUTO: 39.5 FL (ref 37–54)
EGFRCR SERPLBLD CKD-EPI 2021: 72.4 ML/MIN/1.73
EOSINOPHIL # BLD AUTO: 0.05 10*3/MM3 (ref 0–0.4)
EOSINOPHIL NFR BLD AUTO: 1 % (ref 0.3–6.2)
ERYTHROCYTE [DISTWIDTH] IN BLOOD BY AUTOMATED COUNT: 12.6 % (ref 12.3–15.4)
GLOBULIN UR ELPH-MCNC: 2.7 GM/DL
GLUCOSE SERPL-MCNC: 80 MG/DL (ref 65–99)
HBA1C MFR BLD: 5.4 % (ref 3.5–5.6)
HCT VFR BLD AUTO: 36.8 % (ref 37.5–51)
HDLC SERPL-MCNC: 39 MG/DL (ref 40–60)
HGB BLD-MCNC: 12.1 G/DL (ref 13–17.7)
IMM GRANULOCYTES # BLD AUTO: 0.02 10*3/MM3 (ref 0–0.05)
IMM GRANULOCYTES NFR BLD AUTO: 0.4 % (ref 0–0.5)
LDLC SERPL CALC-MCNC: 66 MG/DL (ref 0–100)
LDLC/HDLC SERPL: 1.64 {RATIO}
LYMPHOCYTES # BLD AUTO: 1.35 10*3/MM3 (ref 0.7–3.1)
LYMPHOCYTES NFR BLD AUTO: 27.3 % (ref 19.6–45.3)
MCH RBC QN AUTO: 28.9 PG (ref 26.6–33)
MCHC RBC AUTO-ENTMCNC: 32.9 G/DL (ref 31.5–35.7)
MCV RBC AUTO: 88 FL (ref 79–97)
MICROALBUMIN/CREAT UR: 4.9 MG/G
MONOCYTES # BLD AUTO: 0.39 10*3/MM3 (ref 0.1–0.9)
MONOCYTES NFR BLD AUTO: 7.9 % (ref 5–12)
NEUTROPHILS NFR BLD AUTO: 3.12 10*3/MM3 (ref 1.7–7)
NEUTROPHILS NFR BLD AUTO: 63 % (ref 42.7–76)
NRBC BLD AUTO-RTO: 0 /100 WBC (ref 0–0.2)
PLATELET # BLD AUTO: 315 10*3/MM3 (ref 140–450)
PMV BLD AUTO: 9.6 FL (ref 6–12)
POTASSIUM SERPL-SCNC: 4.9 MMOL/L (ref 3.5–5.2)
PROT SERPL-MCNC: 6.8 G/DL (ref 6–8.5)
PSA SERPL-MCNC: 0.35 NG/ML (ref 0–4)
RBC # BLD AUTO: 4.18 10*6/MM3 (ref 4.14–5.8)
SODIUM SERPL-SCNC: 137 MMOL/L (ref 136–145)
TRIGL SERPL-MCNC: 126 MG/DL (ref 0–150)
VLDLC SERPL-MCNC: 23 MG/DL (ref 5–40)
WBC NRBC COR # BLD: 4.95 10*3/MM3 (ref 3.4–10.8)

## 2023-01-20 PROCEDURE — 36415 COLL VENOUS BLD VENIPUNCTURE: CPT

## 2023-01-20 PROCEDURE — 80053 COMPREHEN METABOLIC PANEL: CPT

## 2023-01-20 PROCEDURE — G0103 PSA SCREENING: HCPCS

## 2023-01-20 PROCEDURE — 85025 COMPLETE CBC W/AUTO DIFF WBC: CPT

## 2023-01-20 PROCEDURE — 82043 UR ALBUMIN QUANTITATIVE: CPT

## 2023-01-20 PROCEDURE — 80061 LIPID PANEL: CPT

## 2023-01-20 PROCEDURE — 82570 ASSAY OF URINE CREATININE: CPT

## 2023-01-20 PROCEDURE — 83036 HEMOGLOBIN GLYCOSYLATED A1C: CPT

## 2023-03-13 ENCOUNTER — HOSPITAL ENCOUNTER (EMERGENCY)
Facility: HOSPITAL | Age: 48
Discharge: HOME | End: 2023-03-13
Attending: EMERGENCY MEDICINE | Admitting: EMERGENCY MEDICINE
Payer: OTHER GOVERNMENT

## 2023-03-13 ENCOUNTER — APPOINTMENT (OUTPATIENT)
Dept: CT IMAGING | Facility: HOSPITAL | Age: 48
End: 2023-03-13
Payer: OTHER GOVERNMENT

## 2023-03-13 VITALS
TEMPERATURE: 98.7 F | WEIGHT: 229.28 LBS | RESPIRATION RATE: 18 BRPM | DIASTOLIC BLOOD PRESSURE: 79 MMHG | SYSTOLIC BLOOD PRESSURE: 115 MMHG | OXYGEN SATURATION: 99 % | BODY MASS INDEX: 32.1 KG/M2 | HEART RATE: 81 BPM | HEIGHT: 71 IN

## 2023-03-13 DIAGNOSIS — R51.9 ACUTE NONINTRACTABLE HEADACHE, UNSPECIFIED HEADACHE TYPE: Primary | ICD-10-CM

## 2023-03-13 DIAGNOSIS — E86.0 DEHYDRATION: ICD-10-CM

## 2023-03-13 LAB
ALBUMIN SERPL-MCNC: 4 G/DL (ref 3.5–5.2)
ALBUMIN/GLOB SERPL: 1.3 G/DL
ALP SERPL-CCNC: 97 U/L (ref 39–117)
ALT SERPL W P-5'-P-CCNC: 11 U/L (ref 1–41)
ANION GAP SERPL CALCULATED.3IONS-SCNC: 12 MMOL/L (ref 5–15)
AST SERPL-CCNC: 15 U/L (ref 1–40)
B PARAPERT DNA SPEC QL NAA+PROBE: NOT DETECTED
B PERT DNA SPEC QL NAA+PROBE: NOT DETECTED
BASOPHILS # BLD AUTO: 0 10*3/MM3 (ref 0–0.2)
BASOPHILS NFR BLD AUTO: 0.5 % (ref 0–1.5)
BILIRUB SERPL-MCNC: 0.3 MG/DL (ref 0–1.2)
BILIRUB UR QL STRIP: NEGATIVE
BUN SERPL-MCNC: 19 MG/DL (ref 6–20)
BUN/CREAT SERPL: 13.9 (ref 7–25)
C PNEUM DNA NPH QL NAA+NON-PROBE: NOT DETECTED
CALCIUM SPEC-SCNC: 9.3 MG/DL (ref 8.6–10.5)
CHLORIDE SERPL-SCNC: 105 MMOL/L (ref 98–107)
CLARITY UR: CLEAR
CO2 SERPL-SCNC: 21 MMOL/L (ref 22–29)
COLOR UR: ABNORMAL
CREAT SERPL-MCNC: 1.37 MG/DL (ref 0.76–1.27)
D-LACTATE SERPL-SCNC: 0.9 MMOL/L (ref 0.3–2)
D-LACTATE SERPL-SCNC: 2.4 MMOL/L (ref 0.3–2)
DEPRECATED RDW RBC AUTO: 42.4 FL (ref 37–54)
EGFRCR SERPLBLD CKD-EPI 2021: 64.8 ML/MIN/1.73
EOSINOPHIL # BLD AUTO: 0 10*3/MM3 (ref 0–0.4)
EOSINOPHIL NFR BLD AUTO: 0.8 % (ref 0.3–6.2)
ERYTHROCYTE [DISTWIDTH] IN BLOOD BY AUTOMATED COUNT: 13.3 % (ref 12.3–15.4)
FLUAV SUBTYP SPEC NAA+PROBE: NOT DETECTED
FLUBV RNA ISLT QL NAA+PROBE: NOT DETECTED
GLOBULIN UR ELPH-MCNC: 3.2 GM/DL
GLUCOSE SERPL-MCNC: 117 MG/DL (ref 65–99)
GLUCOSE UR STRIP-MCNC: NEGATIVE MG/DL
HADV DNA SPEC NAA+PROBE: NOT DETECTED
HCOV 229E RNA SPEC QL NAA+PROBE: NOT DETECTED
HCOV HKU1 RNA SPEC QL NAA+PROBE: NOT DETECTED
HCOV NL63 RNA SPEC QL NAA+PROBE: NOT DETECTED
HCOV OC43 RNA SPEC QL NAA+PROBE: NOT DETECTED
HCT VFR BLD AUTO: 38.2 % (ref 37.5–51)
HGB BLD-MCNC: 12.7 G/DL (ref 13–17.7)
HGB UR QL STRIP.AUTO: NEGATIVE
HMPV RNA NPH QL NAA+NON-PROBE: NOT DETECTED
HOLD SPECIMEN: NORMAL
HOLD SPECIMEN: NORMAL
HPIV1 RNA ISLT QL NAA+PROBE: NOT DETECTED
HPIV2 RNA SPEC QL NAA+PROBE: NOT DETECTED
HPIV3 RNA NPH QL NAA+PROBE: NOT DETECTED
HPIV4 P GENE NPH QL NAA+PROBE: NOT DETECTED
KETONES UR QL STRIP: ABNORMAL
LEUKOCYTE ESTERASE UR QL STRIP.AUTO: NEGATIVE
LYMPHOCYTES # BLD AUTO: 1.3 10*3/MM3 (ref 0.7–3.1)
LYMPHOCYTES NFR BLD AUTO: 23.7 % (ref 19.6–45.3)
M PNEUMO IGG SER IA-ACNC: NOT DETECTED
MCH RBC QN AUTO: 28.8 PG (ref 26.6–33)
MCHC RBC AUTO-ENTMCNC: 33.2 G/DL (ref 31.5–35.7)
MCV RBC AUTO: 86.8 FL (ref 79–97)
MONOCYTES # BLD AUTO: 0.4 10*3/MM3 (ref 0.1–0.9)
MONOCYTES NFR BLD AUTO: 7.7 % (ref 5–12)
NEUTROPHILS NFR BLD AUTO: 3.6 10*3/MM3 (ref 1.7–7)
NEUTROPHILS NFR BLD AUTO: 67.3 % (ref 42.7–76)
NITRITE UR QL STRIP: NEGATIVE
NRBC BLD AUTO-RTO: 0 /100 WBC (ref 0–0.2)
PH UR STRIP.AUTO: <=5 [PH] (ref 5–8)
PLATELET # BLD AUTO: 299 10*3/MM3 (ref 140–450)
PMV BLD AUTO: 7.4 FL (ref 6–12)
POTASSIUM SERPL-SCNC: 4.1 MMOL/L (ref 3.5–5.2)
PROCALCITONIN SERPL-MCNC: 0.06 NG/ML (ref 0–0.25)
PROT SERPL-MCNC: 7.2 G/DL (ref 6–8.5)
PROT UR QL STRIP: ABNORMAL
RBC # BLD AUTO: 4.4 10*6/MM3 (ref 4.14–5.8)
RHINOVIRUS RNA SPEC NAA+PROBE: NOT DETECTED
RSV RNA NPH QL NAA+NON-PROBE: NOT DETECTED
SARS-COV-2 RNA NPH QL NAA+NON-PROBE: NOT DETECTED
SODIUM SERPL-SCNC: 138 MMOL/L (ref 136–145)
SP GR UR STRIP: 1.03 (ref 1–1.03)
UROBILINOGEN UR QL STRIP: ABNORMAL
WBC NRBC COR # BLD: 5.3 10*3/MM3 (ref 3.4–10.8)
WHOLE BLOOD HOLD COAG: NORMAL
WHOLE BLOOD HOLD SPECIMEN: NORMAL

## 2023-03-13 PROCEDURE — 87040 BLOOD CULTURE FOR BACTERIA: CPT | Performed by: EMERGENCY MEDICINE

## 2023-03-13 PROCEDURE — 25010000002 ONDANSETRON PER 1 MG: Performed by: EMERGENCY MEDICINE

## 2023-03-13 PROCEDURE — 96374 THER/PROPH/DIAG INJ IV PUSH: CPT

## 2023-03-13 PROCEDURE — 96375 TX/PRO/DX INJ NEW DRUG ADDON: CPT

## 2023-03-13 PROCEDURE — 96361 HYDRATE IV INFUSION ADD-ON: CPT

## 2023-03-13 PROCEDURE — 83605 ASSAY OF LACTIC ACID: CPT

## 2023-03-13 PROCEDURE — 84145 PROCALCITONIN (PCT): CPT | Performed by: EMERGENCY MEDICINE

## 2023-03-13 PROCEDURE — 0202U NFCT DS 22 TRGT SARS-COV-2: CPT | Performed by: EMERGENCY MEDICINE

## 2023-03-13 PROCEDURE — 87077 CULTURE AEROBIC IDENTIFY: CPT

## 2023-03-13 PROCEDURE — 87147 CULTURE TYPE IMMUNOLOGIC: CPT | Performed by: EMERGENCY MEDICINE

## 2023-03-13 PROCEDURE — 81003 URINALYSIS AUTO W/O SCOPE: CPT | Performed by: EMERGENCY MEDICINE

## 2023-03-13 PROCEDURE — 85025 COMPLETE CBC W/AUTO DIFF WBC: CPT | Performed by: EMERGENCY MEDICINE

## 2023-03-13 PROCEDURE — 70450 CT HEAD/BRAIN W/O DYE: CPT

## 2023-03-13 PROCEDURE — 87150 DNA/RNA AMPLIFIED PROBE: CPT | Performed by: EMERGENCY MEDICINE

## 2023-03-13 PROCEDURE — 87186 SC STD MICRODIL/AGAR DIL: CPT | Performed by: EMERGENCY MEDICINE

## 2023-03-13 PROCEDURE — 25010000002 KETOROLAC TROMETHAMINE PER 15 MG: Performed by: EMERGENCY MEDICINE

## 2023-03-13 PROCEDURE — 99284 EMERGENCY DEPT VISIT MOD MDM: CPT

## 2023-03-13 PROCEDURE — 36415 COLL VENOUS BLD VENIPUNCTURE: CPT

## 2023-03-13 PROCEDURE — 80053 COMPREHEN METABOLIC PANEL: CPT | Performed by: EMERGENCY MEDICINE

## 2023-03-13 PROCEDURE — 87077 CULTURE AEROBIC IDENTIFY: CPT | Performed by: EMERGENCY MEDICINE

## 2023-03-13 PROCEDURE — 87186 SC STD MICRODIL/AGAR DIL: CPT

## 2023-03-13 RX ORDER — SODIUM CHLORIDE 0.9 % (FLUSH) 0.9 %
10 SYRINGE (ML) INJECTION AS NEEDED
Status: DISCONTINUED | OUTPATIENT
Start: 2023-03-13 | End: 2023-03-13 | Stop reason: HOSPADM

## 2023-03-13 RX ORDER — ONDANSETRON 2 MG/ML
4 INJECTION INTRAMUSCULAR; INTRAVENOUS ONCE
Status: COMPLETED | OUTPATIENT
Start: 2023-03-13 | End: 2023-03-13

## 2023-03-13 RX ORDER — KETOROLAC TROMETHAMINE 30 MG/ML
30 INJECTION, SOLUTION INTRAMUSCULAR; INTRAVENOUS ONCE
Status: COMPLETED | OUTPATIENT
Start: 2023-03-13 | End: 2023-03-13

## 2023-03-13 RX ADMIN — SODIUM CHLORIDE 1000 ML: 9 INJECTION, SOLUTION INTRAVENOUS at 08:32

## 2023-03-13 RX ADMIN — SODIUM CHLORIDE 1000 ML: 9 INJECTION, SOLUTION INTRAVENOUS at 05:45

## 2023-03-13 RX ADMIN — KETOROLAC TROMETHAMINE 30 MG: 30 INJECTION, SOLUTION INTRAMUSCULAR at 07:11

## 2023-03-13 RX ADMIN — ONDANSETRON 4 MG: 2 INJECTION INTRAMUSCULAR; INTRAVENOUS at 05:45

## 2023-03-13 NOTE — ED PROVIDER NOTES
Subjective   History of Present Illness  47-year-old male with gradual onset of moderate diffuse headache Saturday morning.  Patient has had some neck pain but no neck stiffness.  Patient states he had a URI with cough and congestion several weeks ago and then developed nausea and diarrhea on Friday.  Patient states the diarrhea was moderate, watery, nonbloody with some mild abdominal cramps.  Patient denies any focal weakness or numbness.  Patient is felt dizzy when he stands up and walks.  Light makes the headache worse.  Patient has had some chills and subjective fever.        Review of Systems   Constitutional: Positive for fatigue and fever.   HENT: Positive for congestion.    Respiratory: Positive for cough.    Gastrointestinal: Positive for abdominal pain, diarrhea and nausea.   Neurological: Positive for light-headedness and headaches.   All other systems reviewed and are negative.      Past Medical History:   Diagnosis Date   • Depression    • Diabetes mellitus (CMS/HCC)    • Hyperlipidemia    • Hypertension        Allergies   Allergen Reactions   • Prochlorperazine Unknown (See Comments)       Past Surgical History:   Procedure Laterality Date   • BACK SURGERY         No family history on file.    Social History     Socioeconomic History   • Marital status: Single   Tobacco Use   • Smoking status: Never   Substance and Sexual Activity   • Alcohol use: No   • Drug use: No   • Sexual activity: Never           Objective   Physical Exam  Constitutional:       Appearance: Normal appearance.   HENT:      Head: Normocephalic and atraumatic.      Mouth/Throat:      Mouth: Mucous membranes are moist.      Pharynx: Oropharynx is clear.   Eyes:      Extraocular Movements: Extraocular movements intact.      Pupils: Pupils are equal, round, and reactive to light.   Cardiovascular:      Rate and Rhythm: Normal rate and regular rhythm.      Heart sounds: Normal heart sounds.   Pulmonary:      Effort: Pulmonary effort is  "normal.      Breath sounds: Normal breath sounds.   Abdominal:      General: Bowel sounds are normal. There is no distension.      Palpations: Abdomen is soft.      Tenderness: There is no abdominal tenderness.   Musculoskeletal:         General: Normal range of motion.      Cervical back: Normal range of motion and neck supple. No rigidity.   Skin:     General: Skin is warm and dry.      Capillary Refill: Capillary refill takes less than 2 seconds.   Neurological:      Mental Status: He is alert and oriented to person, place, and time.      Cranial Nerves: No cranial nerve deficit.      Sensory: No sensory deficit.      Motor: No weakness.   Psychiatric:         Mood and Affect: Mood normal.         Behavior: Behavior normal.         Procedures           ED Course  ED Course as of 03/13/23 1033   Mon Mar 13, 2023   0847 Patient care transferred to me pending another liter of fluids along with ambulating the patient and disposition [AA]   1030 CT Head Without Contrast [AA]      ED Course User Index  [AA] Ninoska Jay PA    /79 (BP Location: Left arm, Patient Position: Lying)   Pulse 81   Temp 98.7 °F (37.1 °C) (Rectal)   Resp 18   Ht 180.3 cm (71\")   Wt 104 kg (229 lb 4.5 oz)   SpO2 99%   BMI 31.98 kg/m²   Medications   sodium chloride 0.9 % flush 10 mL (has no administration in time range)   sodium chloride 0.9 % bolus 1,000 mL (0 mL Intravenous Stopped 3/13/23 0711)   ondansetron (ZOFRAN) injection 4 mg (4 mg Intravenous Given 3/13/23 0545)   ketorolac (TORADOL) injection 30 mg (30 mg Intravenous Given 3/13/23 0711)   sodium chloride 0.9 % bolus 1,000 mL (0 mL Intravenous Stopped 3/13/23 0902)     Labs Reviewed   URINALYSIS W/ CULTURE IF INDICATED - Abnormal; Notable for the following components:       Result Value    Color, UA Dark Yellow (*)     Specific Gravity, UA 1.033 (*)     Ketones, UA Trace (*)     Protein, UA Trace (*)     All other components within normal limits    Narrative:     " In absence of clinical symptoms, the presence of pyuria, bacteria, and/or nitrites on the urinalysis result does not correlate with infection.  Urine microscopic not indicated.   COMPREHENSIVE METABOLIC PANEL - Abnormal; Notable for the following components:    Glucose 117 (*)     Creatinine 1.37 (*)     CO2 21.0 (*)     All other components within normal limits    Narrative:     GFR Normal >60  Chronic Kidney Disease <60  Kidney Failure <15     CBC WITH AUTO DIFFERENTIAL - Abnormal; Notable for the following components:    Hemoglobin 12.7 (*)     All other components within normal limits   POC LACTATE - Abnormal; Notable for the following components:    Lactate 2.4 (*)     All other components within normal limits   RESPIRATORY PANEL PCR W/ COVID-19 (SARS-COV-2) [facility]/COR/MAD/[facility]DI IN-HOUSE, NP SWAB IN UT/VTP, 3-4 HR TAT - Normal    Narrative:     In the setting of a positive respiratory panel with a viral infection PLUS a negative procalcitonin without other underlying concern for bacterial infection, consider observing off antibiotics or discontinuation of antibiotics and continue supportive care. If the respiratory panel is positive for atypical bacterial infection (Bordetella pertussis, Chlamydophila pneumoniae, or Mycoplasma pneumoniae), consider antibiotic de-escalation to target atypical bacterial infection.   PROCALCITONIN - Normal    Narrative:     As a Marker for Sepsis (Non-Neonates):    1. <0.5 ng/mL represents a low risk of severe sepsis and/or septic shock.  2. >2 ng/mL represents a high risk of severe sepsis and/or septic shock.    As a Marker for Lower Respiratory Tract Infections that require antibiotic therapy:    PCT on Admission    Antibiotic Therapy       6-12 Hrs later    >0.5                Strongly Recommended  >0.25 - <0.5        Recommended   0.1 - 0.25          Discouraged              Remeasure/reassess PCT  <0.1                Strongly Discouraged     Remeasure/reassess  "PCT    As 28 day mortality risk marker: \"Change in Procalcitonin Result\" (>80% or <=80%) if Day 0 (or Day 1) and Day 4 values are available. Refer to http://www.[facility]-pct-calculator.com    Change in PCT <=80%  A decrease of PCT levels below or equal to 80% defines a positive change in PCT test result representing a higher risk for 28-day all-cause mortality of patients diagnosed with severe sepsis for septic shock.    Change in PCT >80%  A decrease of PCT levels of more than 80% defines a negative change in PCT result representing a lower risk for 28-day all-cause mortality of patients diagnosed with severe sepsis or septic shock.      POC LACTATE - Normal   BLOOD CULTURE   BLOOD CULTURE   RAINBOW DRAW    Narrative:     The following orders were created for panel order [place] Draw.  Procedure                               Abnormality         Status                     ---------                               -----------         ------                     Green Top (Gel)[[id number]]                                  Final result               Lavender Top[[id number]]                                     Final result               Gold Top - SST[[id number]]                                   Final result               Light Blue Top[[id number]]                                   Final result                 Please view results for these tests on the individual orders.   LACTIC ACID, REFLEX   POC LACTATE   POC LACTATE   CBC AND DIFFERENTIAL    Narrative:     The following orders were created for panel order CBC & Differential.  Procedure                               Abnormality         Status                     ---------                               -----------         ------                     CBC Auto Differential[[id number]]        Abnormal            Final result                 Please view results for these tests on the individual orders.   GREEN TOP   LAVENDER TOP   GOLD TOP - SST   LIGHT BLUE TOP     CT Head Without " Contrast    Result Date: 3/13/2023  No acute large territorial infarct, acute intracranial hemorrhage, or intracranial mass identified. Electronically signed by:  Jose Ding M.D.  3/13/2023 4:15 AM Mountain Time                                           Medical Decision Making  On reevaluation, patient feels much better after IV fluids and Toradol, headache resolved.  Would favor dehydration exacerbating a headache in the setting of recent diarrheal illness.  CT imaging of the head negative    Patient care was transferred to me from Dr. Vogt pending reassessment after second liter of fluids.  Upon reassessment patient is resting comfortably he is ambulatory with a steady gait.  Significant improvement of tachycardia that was present upon arrival to the ED.  Patient had no acute cranial focal neural deficits or meningeal signs.  I also reviewed labs and imaging as stated above dehydration was favored for acute headache.  Patient voiced understanding of discharge along with signs and symptoms to return.  Patient was given a work note.    This document is intended for medical expert use only. Reading of this document by patients and/or patient's family without participating medical staff guidance may result in misinterpretation and unintended morbidity.  Any interpretation of such data is the responsibility of the patient and/or family member responsible for the patient in concert with their primary or specialist providers, not to be left for sources of online searches such as [employer], Everwise or similar queries. Relying on these approaches to knowledge may result in misinterpretation, misguided goals of care and even death should patients or family members try recommendations outside of the realm of professional medical care in a supervised inpatient environment.           Acute nonintractable headache, unspecified headache type: acute illness or injury     Details: As above  Dehydration: acute illness or  injury     Details: Hydrated in the emergency department  Amount and/or Complexity of Data Reviewed  Labs: ordered.     Details: Normal procalcitonin, white blood cell count 5, mild elevation of lactic 2.4, rechecked 0.9  Radiology: ordered. Decision-making details documented in ED Course.     Details: Negative imaging of the head      Risk  Prescription drug management.          Final diagnoses:   Acute nonintractable headache, unspecified headache type   Dehydration       ED Disposition  ED Disposition     ED Disposition   Discharge    Condition   Stable    Comment   --             Venancio Hurley MD  [address] [facility] [place] [address] [phone]    Schedule an appointment as soon as possible for a visit       [facility] EMERGENCY DEPARTMENT  [facility] [facility] [address] [phone]  Go to   If symptoms worsen         Medication List      No changes were made to your prescriptions during this visit.          Ninoska Jay PA  03/13/23 1032       Ninoska Jay PA  03/13/23 1033

## 2023-03-13 NOTE — DISCHARGE INSTRUCTIONS
Follow-up with your primary care provider in 3-5 days.  If you do not have a primary care provider call [phone] for help in finding one, or you may follow up with [facility] at [phone].

## 2023-03-14 NOTE — PROGRESS NOTES
Patient presented with dizziness, headache. Afebrile, WBC WNL, lactate WNL after fluids. Diagnosed with dehydration and headache. Preliminary blood culture resulted with gram-negative coccobacilli in 1/2 sets. Patient was not given a Rx at time of discharge. Discussed with GUIDO Vega, this is likely a contaminant. Will continue to follow until C&S final.     Microbiology Results (last 10 days)       Procedure Component Value - Date/Time    Respiratory Panel PCR w/COVID-19(SARS-CoV-2) [facility]/PAD/COR/MAD/ADDI In-House, NP Swab in UTM/VTM, 3-4 HR TAT - Swab, Nasopharynx [[id number]]  (Normal) Collected: 03/13/23 0548    Lab Status: Final result Specimen: Swab from Nasopharynx Updated: 03/13/23 0641     ADENOVIRUS, PCR Not Detected     Coronavirus 229E Not Detected     Coronavirus HKU1 Not Detected     Coronavirus NL63 Not Detected     Coronavirus OC43 Not Detected     COVID19 Not Detected     Human Metapneumovirus Not Detected     Human Rhinovirus/Enterovirus Not Detected     Influenza A PCR Not Detected     Influenza B PCR Not Detected     Parainfluenza Virus 1 Not Detected     Parainfluenza Virus 2 Not Detected     Parainfluenza Virus 3 Not Detected     Parainfluenza Virus 4 Not Detected     RSV, PCR Not Detected     Bordetella pertussis pcr Not Detected     Bordetella parapertussis PCR Not Detected     Chlamydophila pneumoniae PCR Not Detected     Mycoplasma pneumo by PCR Not Detected    Narrative:      In the setting of a positive respiratory panel with a viral infection PLUS a negative procalcitonin without other underlying concern for bacterial infection, consider observing off antibiotics or discontinuation of antibiotics and continue supportive care. If the respiratory panel is positive for atypical bacterial infection (Bordetella pertussis, Chlamydophila pneumoniae, or Mycoplasma pneumoniae), consider antibiotic de-escalation to target atypical bacterial infection.    Blood Culture - Blood, Arm, Right  [[id number]]  (Abnormal) Collected: 03/13/23 0548    Lab Status: Preliminary result Specimen: Blood from Arm, Right Updated: 03/14/23 0758     Blood Culture Abnormal Stain     Gram Stain Anaerobic Bottle Gram negative coccobacilli    Blood Culture - Blood, Arm, Right [[id number]]  (Normal) Collected: 03/13/23 0534    Lab Status: Preliminary result Specimen: Blood from Arm, Right Updated: 03/14/23 0545     Blood Culture No growth at 24 hours            Catia Whitman RPH  3/14/2023 12:45 EDT

## 2023-03-15 LAB
BACTERIA BLD CULT: ABNORMAL
BACTERIA SPEC AEROBE CULT: ABNORMAL
BOTTLE TYPE: ABNORMAL
GRAM STN SPEC: ABNORMAL
ISOLATED FROM: ABNORMAL

## 2023-03-15 NOTE — PROGRESS NOTES
Patient presented with dizziness, headache. Afebrile, WBC WNL, lactate WNL after fluids. Diagnosed with dehydration and headache. Preliminary blood culture resulted with gram-negative coccobacilli in 1/2 sets. Second set is growing coag-negative staph.    Patient was not given a Rx at time of discharge. Discussed with Dr. Gonzales. Both of these are considered contaminants at this time. Will still continue to follow until C&S final for the coccobacilli (culture is in progress).    Microbiology Results (last 10 days)       Procedure Component Value - Date/Time    Respiratory Panel PCR w/COVID-19(SARS-CoV-2) [facility]/PAD/COR/MAD/ADDI In-House, NP Swab in UTM/VTM, 3-4 HR TAT - Swab, Nasopharynx [[id number]]  (Normal) Collected: 03/13/23 0548    Lab Status: Final result Specimen: Swab from Nasopharynx Updated: 03/13/23 0641     ADENOVIRUS, PCR Not Detected     Coronavirus 229E Not Detected     Coronavirus HKU1 Not Detected     Coronavirus NL63 Not Detected     Coronavirus OC43 Not Detected     COVID19 Not Detected     Human Metapneumovirus Not Detected     Human Rhinovirus/Enterovirus Not Detected     Influenza A PCR Not Detected     Influenza B PCR Not Detected     Parainfluenza Virus 1 Not Detected     Parainfluenza Virus 2 Not Detected     Parainfluenza Virus 3 Not Detected     Parainfluenza Virus 4 Not Detected     RSV, PCR Not Detected     Bordetella pertussis pcr Not Detected     Bordetella parapertussis PCR Not Detected     Chlamydophila pneumoniae PCR Not Detected     Mycoplasma pneumo by PCR Not Detected    Narrative:      In the setting of a positive respiratory panel with a viral infection PLUS a negative procalcitonin without other underlying concern for bacterial infection, consider observing off antibiotics or discontinuation of antibiotics and continue supportive care. If the respiratory panel is positive for atypical bacterial infection (Bordetella pertussis, Chlamydophila pneumoniae, or Mycoplasma  pneumoniae), consider antibiotic de-escalation to target atypical bacterial infection.    Blood Culture - Blood, Arm, Right [[id number]]  (Abnormal) Collected: 03/13/23 0548    Lab Status: Preliminary result Specimen: Blood from Arm, Right Updated: 03/15/23 0827     Blood Culture Culture in progress     Gram Stain Anaerobic Bottle Gram negative coccobacilli    Blood Culture - Blood, Arm, Right [[id number]]  (Abnormal) Collected: 03/13/23 0534    Lab Status: Final result Specimen: Blood from Arm, Right Updated: 03/15/23 0827     Blood Culture Staphylococcus, coagulase negative     Isolated from Aerobic Bottle     Gram Stain Aerobic Bottle Gram positive cocci in clusters    Narrative:      Probable contaminant requires clinical correlation, susceptibility not performed unless requested by physician.      Blood Culture ID, PCR - Blood, Arm, Right [[id number]]  (Abnormal) Collected: 03/13/23 0534    Lab Status: Final result Specimen: Blood from Arm, Right Updated: 03/15/23 0625     BCID, PCR Staph spp, not aureus or lugdunensis. Identification by BCID2 PCR.     BOTTLE TYPE Aerobic Bottle            Catia Whitman RPH  3/15/2023 10:37 EDT

## 2023-03-23 LAB
BACTERIA SPEC AEROBE CULT: ABNORMAL
GRAM STN SPEC: ABNORMAL
ISOLATED FROM: ABNORMAL

## 2023-03-23 NOTE — PHARMACY RECOMMENDATION
Patient presented to the ED on 3/13 with dizziness, headache. Afebrile, WBC WNL, lactate WNL after fluids. Diagnosed with dehydration and headache. Blood culture resulted with acinetobacter johnsonii (susceptibilities attached in Epic) in 1/2 sets. Second set is growing coag-negative staph.    Patient was not given a Rx at time of discharge. Case discussed with Dr. Gonzales and offered to bring patient back for reevaluation since acinetobacter is not considered a common blood contaminant. Call the patient and if patent is feeling worse (fevers, headache, malaise), bring back for blood culture redraw. If patient is feeling better, can consider contamination. Spoke to the patient who stated no headaches, fevers or dizziness; dehydrating is better. Patient stated that episode for just a one time occurance. Overall he stated he is feeling well. No further intervention required.     Contains critical data Blood Culture - Blood, Arm, Right  Order: [id number]  Status: Final result     Visible to patient: No (scheduled for 3/23/2023 12:03 PM)     Next appt: None     Specimen Information: Arm, Right; Blood   0 Result Notes  Blood Culture   Lab   Acinetobacter johnsonii Critical    [facility]      Isolated from Anaerobic Bottle             ID and Susceptibility performed at [facility]. See scanned report.              Gram Stain   Critical    Lab   Anaerobic Bottle Gram negative coccobacilli  [facility]                    Specimen Collected: 03/13/23 05:48 EDT Last Resulted: 03/23/23 11:03 EDT               Linda Zhao, PharmD  3/23/2023 14:26 EDT

## 2024-06-25 ENCOUNTER — HOSPITAL ENCOUNTER (EMERGENCY)
Facility: HOSPITAL | Age: 49
Discharge: HOME | End: 2024-06-25
Admitting: EMERGENCY MEDICINE
Payer: OTHER GOVERNMENT

## 2024-06-25 VITALS
SYSTOLIC BLOOD PRESSURE: 130 MMHG | WEIGHT: 227.07 LBS | OXYGEN SATURATION: 98 % | DIASTOLIC BLOOD PRESSURE: 85 MMHG | RESPIRATION RATE: 20 BRPM | BODY MASS INDEX: 31.79 KG/M2 | HEART RATE: 97 BPM | HEIGHT: 71 IN | TEMPERATURE: 98.7 F

## 2024-06-25 DIAGNOSIS — M54.16 LUMBAR RADICULOPATHY: Primary | ICD-10-CM

## 2024-06-25 PROCEDURE — 99282 EMERGENCY DEPT VISIT SF MDM: CPT

## 2024-06-25 RX ORDER — CYCLOBENZAPRINE HCL 10 MG
5 TABLET ORAL 2 TIMES DAILY PRN
Qty: 20 TABLET | Refills: 0 | Status: SHIPPED | OUTPATIENT
Start: 2024-06-25

## 2024-06-25 RX ORDER — METHYLPREDNISOLONE 4 MG/1
TABLET ORAL
Qty: 21 TABLET | Refills: 0 | Status: SHIPPED | OUTPATIENT
Start: 2024-06-25

## 2024-06-25 NOTE — Clinical Note
[facility] EMERGENCY DEPARTMENT  [address] [place] [address]  Phone: [phone]    Gurmeet Garcia was seen and treated in our emergency department on 6/25/2024.  He may return to work on 06/27/2024.         Thank you for choosing [facility].    Ruth Gama RN

## 2024-06-25 NOTE — ED PROVIDER NOTES
Subjective   History of Present Illness  Chief complaint: Low back pain      Context: 48-year-old male presents amatory by private vehicle with complaints of low back pain that radiates down the right leg for the last 4 days.  He states it feels like sciatica.  He states he sits 10 hours a day as he works from home on a computer and denies any injury.  Denies any bowel or bladder incontinence or retention.  Denies any history of chemoradiation osteoporosis IV drug use chronic steroid use or fever.  States he was helping only move some furniture approximately a week ago but denies any injury associated with this.  He reports a prior history of lumbar discectomy and prior cervical surgery x 13 years ago.  He is on Percocet 7.56 4 times daily most recently filled on 6/24/2024        PCP: Tavia         Review of Systems   Constitutional:  Negative for fever.       Past Medical History:   Diagnosis Date    Depression     Diabetes mellitus     Hyperlipidemia     Hypertension        Allergies   Allergen Reactions    Prochlorperazine Unknown (See Comments)       Past Surgical History:   Procedure Laterality Date    BACK SURGERY         No family history on file.    Social History     Socioeconomic History    Marital status: Single   Tobacco Use    Smoking status: Never   Substance and Sexual Activity    Alcohol use: No    Drug use: No    Sexual activity: Never           Objective   Physical Exam    Vital signs and traige nurse note reviewed.  Constitutional:  Awake, alert; well developed and well nourished.  No acute distress, the patient is examined in hospital gown.  HEENT:  Normocephalic,    Neck: Supple, full range of motion without pain;    Cardiovascular: Regular rate and rhythm   Pulmonary: Respiratory effort regular, nonlabored   Musculoskeletal: Independent range of motion of all extremities, no palpable tenderness or edema.   Back:  Spine is midline and without midline tenderness on palpation of cervical,  thoracic, midline tenderness over the lumbar spine without any induration erythema bony step-off or crepitus; paraspinal musculature is tender right lumbar piriformis and around the SI joint and without soft tissue swelling, overlying ecchymosis or erythema. ROM reproduces pain,  Distal muscle strength is 5/5.  Neuro: Alert oriented x3, speech is clear and appropriate. DTRs are symmetrical bilateral lower extremity, negative Babinski BLE,  strong and equal dorsiflexion of bilateral great toes L4, L5, S1 motor sensory intact.      Procedures           ED Course      Labs Reviewed - No data to display  Medications - No data to display  No radiology results for the last day  Prior to Admission medications    Medication Sig Start Date End Date Taking? Authorizing Provider   cyclobenzaprine (FLEXERIL) 10 MG tablet Take 0.5 tablets by mouth 2 (Two) Times a Day As Needed for Muscle Spasms. 6/25/24  Yes Magda García APRN   ALPRAZolam (XANAX) 1 MG tablet Take 1 mg by mouth 4 (Four) Times a Day As Needed for Anxiety.    Thomas Son MD   amLODIPine (NORVASC) 10 MG tablet Take 5 mg by mouth 2 (Two) Times a Day.    Thomas Son MD   atenolol (TENORMIN) 25 MG tablet Take 25 mg by mouth Daily.    Thomas Son MD   atorvastatin (LIPITOR) 20 MG tablet Take 20 mg by mouth Daily.    Thomas Son MD   DULoxetine (CYMBALTA) 60 MG capsule Take 60 mg by mouth Daily.    Thomas Son MD   hydrALAZINE (APRESOLINE) 25 MG tablet Take 25 mg by mouth Daily.    Thomas Son MD   HYDROcodone-acetaminophen (NORCO) 7.5-325 MG per tablet Take 1 tablet by mouth Every 4 (Four) Hours As Needed for Moderate Pain .    Thomas Son MD   lisinopril (PRINIVIL,ZESTRIL) 40 MG tablet Take 40 mg by mouth Daily.    Thomas Son MD   metFORMIN (GLUCOPHAGE) 1000 MG tablet Take 1,000 mg by mouth 2 (Two) Times a Day With Meals.    Thomas Son MD   methylPREDNISolone (MEDROL) 4 MG  "dose pack Take as directed on package instructions. 6/25/24   Magda García APRN   PARoxetine (PAXIL) 30 MG tablet Take 60 mg by mouth Every Morning.    ProviderThomas MD   pioglitazone (ACTOS) 30 MG tablet Take 45 mg by mouth Daily.    Thomas Son MD   QUEtiapine (SEROquel) 50 MG tablet Take 50 mg by mouth 2 (Two) Times a Day.    Thomas Son MD   ciprofloxacin-dexamethasone (CIPRODEX) 0.3-0.1 % otic suspension Administer 4 drops into the left ear 2 (Two) Times a Day. 9/15/19 6/25/24  Rosemarie Hurley PA   cyclobenzaprine (FLEXERIL) 10 MG tablet Take 10 mg by mouth 2 (Two) Times a Day As Needed for Muscle Spasms.  6/25/24  Thmoas Son MD                                            Medical Decision Making      /85 (BP Location: Right arm)   Pulse 97   Temp 98.7 °F (37.1 °C) (Oral)   Resp 20   Ht 180.3 cm (71\")   Wt 103 kg (227 lb 1.2 oz)   SpO2 98%   BMI 31.67 kg/m²      Inspect reviewed    Radiology interpretation:  X-rays considered but not felt to be emergently warranted       Appropriate PPE worn during exam.  Discussed findings with patient.  He is requesting information for Dr. Sanchez.  He has no symptoms consistent with cauda equina or epidural abscess.  Patient was discharged home on Flexeril and Medrol.        i discussed findings with patient who voices understanding of discharge instructions, signs and symptoms requiring return to ED; discharged improved and in stable condition with follow up for re-evaluation.  This document is intended for medical expert use only. Reading of this document by patients and/or patient's family without participating medical staff guidance may result in misinterpretation and unintended morbidity.  Any interpretation of such data is the responsibility of the patient and/or family member responsible for the patient in concert with their primary or specialist providers, not to be left for sources of online searches such as " [employer], [employer] or similar queries. Relying on these approaches to knowledge may result in misinterpretation, misguided goals of care and even death should patients or family members try recommendations outside of the realm of professional medical care in a supervised inpatient environment.     Discussed with Dr. Barger    Problems Addressed:  Lumbar radiculopathy: complicated acute illness or injury    Risk  Prescription drug management.        Final diagnoses:   Lumbar radiculopathy       ED Disposition  ED Disposition       ED Disposition   Discharge    Condition   Stable    Comment   --               Venancio Hurley MD  [address] [facility] [place] [id number] [phone]          Jaycob Armijo II,   [address] [facility] [place] [address] [phone]               Medication List        New Prescriptions      methylPREDNISolone 4 MG dose pack  Commonly known as: MEDROL  Take as directed on package instructions.            Changed      cyclobenzaprine 10 MG tablet  Commonly known as: FLEXERIL  Take 0.5 tablets by mouth 2 (Two) Times a Day As Needed for Muscle Spasms.  What changed: how much to take            Stop      ciprofloxacin-dexAMETHasone 0.3-0.1 % otic suspension  Commonly known as: CIPRODEX               Where to Get Your Medications        These medications were sent to [facility]/pharmacy #[id number] - [place], [place] - [address] [place] [address] - [phone] [facility] [phone]   [address] [facility] [place] [address]      Phone: [phone]   cyclobenzaprine 10 MG tablet  methylPREDNISolone 4 MG dose pack            Magda García, APRN  06/25/24 [id number]

## 2024-06-25 NOTE — Clinical Note
[facility] EMERGENCY DEPARTMENT  [address] [place] [address]  Phone: [phone]    Gurmeet Garcia was seen and treated in our emergency department on 6/25/2024.  He may return to work on 06/27/2024.         Thank you for choosing [facility].    Ruth Gama RN

## 2024-07-26 ENCOUNTER — TELEPHONE (OUTPATIENT)
Dept: ORTHOPEDICS | Facility: OTHER | Age: 49
End: 2024-07-26
Payer: OTHER GOVERNMENT

## 2024-07-26 NOTE — TELEPHONE ENCOUNTER
PATIENT DID NOT SCHEDULE THIS APPOINTMENT AND DOES NOT HAVE A RIDE. HE WANTS TO SEE HIS SPECIALIST FIRST.

## 2024-09-10 ENCOUNTER — HOSPITAL ENCOUNTER (OUTPATIENT)
Dept: CARDIOLOGY | Facility: HOSPITAL | Age: 49
Discharge: HOME | End: 2024-09-10
Payer: OTHER GOVERNMENT

## 2024-09-10 ENCOUNTER — LAB (OUTPATIENT)
Dept: LAB | Facility: HOSPITAL | Age: 49
End: 2024-09-10
Payer: OTHER GOVERNMENT

## 2024-09-10 ENCOUNTER — HOSPITAL ENCOUNTER (OUTPATIENT)
Dept: GENERAL RADIOLOGY | Facility: HOSPITAL | Age: 49
Discharge: HOME | End: 2024-09-10
Payer: OTHER GOVERNMENT

## 2024-09-10 LAB
ABO GROUP BLD: NORMAL
ALBUMIN SERPL-MCNC: 4 G/DL (ref 3.5–5.2)
ALBUMIN/GLOB SERPL: 1.3 G/DL
ALP SERPL-CCNC: 96 U/L (ref 39–117)
ALT SERPL W P-5'-P-CCNC: 10 U/L (ref 1–41)
ANION GAP SERPL CALCULATED.3IONS-SCNC: 10.7 MMOL/L (ref 5–15)
APTT PPP: 29.5 SECONDS (ref 24–31)
AST SERPL-CCNC: 17 U/L (ref 1–40)
BACTERIA UR QL AUTO: ABNORMAL /HPF
BILIRUB SERPL-MCNC: 0.3 MG/DL (ref 0–1.2)
BILIRUB UR QL STRIP: NEGATIVE
BLD GP AB SCN SERPL QL: NEGATIVE
BUN SERPL-MCNC: 12 MG/DL (ref 6–20)
BUN/CREAT SERPL: 8.6 (ref 7–25)
CALCIUM SPEC-SCNC: 9.5 MG/DL (ref 8.6–10.5)
CHLORIDE SERPL-SCNC: 102 MMOL/L (ref 98–107)
CLARITY UR: CLEAR
CO2 SERPL-SCNC: 24.3 MMOL/L (ref 22–29)
COLOR UR: ABNORMAL
CREAT SERPL-MCNC: 1.4 MG/DL (ref 0.76–1.27)
DEPRECATED RDW RBC AUTO: 41.7 FL (ref 37–54)
EGFRCR SERPLBLD CKD-EPI 2021: 62.8 ML/MIN/1.73
ERYTHROCYTE [DISTWIDTH] IN BLOOD BY AUTOMATED COUNT: 12.9 % (ref 12.3–15.4)
GLOBULIN UR ELPH-MCNC: 3 GM/DL
GLUCOSE SERPL-MCNC: 85 MG/DL (ref 65–99)
GLUCOSE UR STRIP-MCNC: NEGATIVE MG/DL
HCT VFR BLD AUTO: 36.3 % (ref 37.5–51)
HGB BLD-MCNC: 13.1 G/DL (ref 13–17.7)
HGB UR QL STRIP.AUTO: NEGATIVE
HOLD SPECIMEN: NORMAL
HYALINE CASTS UR QL AUTO: ABNORMAL /LPF
INR PPP: 0.98 (ref 0.93–1.1)
KETONES UR QL STRIP: ABNORMAL
LEUKOCYTE ESTERASE UR QL STRIP.AUTO: NEGATIVE
MCH RBC QN AUTO: 33.2 PG (ref 26.6–33)
MCHC RBC AUTO-ENTMCNC: 36.1 G/DL (ref 31.5–35.7)
MCV RBC AUTO: 91.9 FL (ref 79–97)
MRSA DNA SPEC QL NAA+PROBE: NORMAL
NITRITE UR QL STRIP: NEGATIVE
PH UR STRIP.AUTO: 5.5 [PH] (ref 5–8)
PLATELET # BLD AUTO: 295 10*3/MM3 (ref 140–450)
PMV BLD AUTO: 10.3 FL (ref 6–12)
POTASSIUM SERPL-SCNC: 3.7 MMOL/L (ref 3.5–5.2)
PROT SERPL-MCNC: 7 G/DL (ref 6–8.5)
PROT UR QL STRIP: ABNORMAL
PROTHROMBIN TIME: 10.7 SECONDS (ref 9.6–11.7)
RBC # BLD AUTO: 3.95 10*6/MM3 (ref 4.14–5.8)
RBC # UR STRIP: ABNORMAL /HPF
REF LAB TEST METHOD: ABNORMAL
RH BLD: POSITIVE
SODIUM SERPL-SCNC: 137 MMOL/L (ref 136–145)
SP GR UR STRIP: >1.03 (ref 1–1.03)
SQUAMOUS #/AREA URNS HPF: ABNORMAL /HPF
T&S EXPIRATION DATE: NORMAL
UROBILINOGEN UR QL STRIP: ABNORMAL
WBC # UR STRIP: ABNORMAL /HPF
WBC NRBC COR # BLD AUTO: 4.82 10*3/MM3 (ref 3.4–10.8)

## 2024-09-10 PROCEDURE — 81001 URINALYSIS AUTO W/SCOPE: CPT

## 2024-09-10 PROCEDURE — 80053 COMPREHEN METABOLIC PANEL: CPT

## 2024-09-10 PROCEDURE — 86901 BLOOD TYPING SEROLOGIC RH(D): CPT

## 2024-09-10 PROCEDURE — 85610 PROTHROMBIN TIME: CPT

## 2024-09-10 PROCEDURE — 86900 BLOOD TYPING SEROLOGIC ABO: CPT

## 2024-09-10 PROCEDURE — 71046 X-RAY EXAM CHEST 2 VIEWS: CPT

## 2024-09-10 PROCEDURE — 93005 ELECTROCARDIOGRAM TRACING: CPT | Performed by: NEUROLOGICAL SURGERY

## 2024-09-10 PROCEDURE — 86850 RBC ANTIBODY SCREEN: CPT

## 2024-09-10 PROCEDURE — 87641 MR-STAPH DNA AMP PROBE: CPT

## 2024-09-10 PROCEDURE — 85730 THROMBOPLASTIN TIME PARTIAL: CPT

## 2024-09-10 PROCEDURE — 85027 COMPLETE CBC AUTOMATED: CPT

## 2024-09-12 LAB
QT INTERVAL: 367 MS
QTC INTERVAL: 472 MS

## 2024-09-16 ENCOUNTER — ANESTHESIA EVENT (OUTPATIENT)
Dept: PERIOP | Facility: HOSPITAL | Age: 49
End: 2024-09-16
Payer: OTHER GOVERNMENT

## 2024-09-16 RX ORDER — OXYCODONE HCL 10 MG/1
10 TABLET, FILM COATED, EXTENDED RELEASE ORAL ONCE
Status: COMPLETED | OUTPATIENT
Start: 2024-09-17 | End: 2024-09-17

## 2024-09-17 ENCOUNTER — HOSPITAL ENCOUNTER (OUTPATIENT)
Facility: HOSPITAL | Age: 49
Setting detail: HOSPITAL OUTPATIENT SURGERY
Discharge: HOME | End: 2024-09-17
Attending: NEUROLOGICAL SURGERY | Admitting: NEUROLOGICAL SURGERY
Payer: OTHER GOVERNMENT

## 2024-09-17 ENCOUNTER — ANESTHESIA (OUTPATIENT)
Dept: PERIOP | Facility: HOSPITAL | Age: 49
End: 2024-09-17
Payer: OTHER GOVERNMENT

## 2024-09-17 ENCOUNTER — APPOINTMENT (OUTPATIENT)
Dept: GENERAL RADIOLOGY | Facility: HOSPITAL | Age: 49
End: 2024-09-17
Payer: OTHER GOVERNMENT

## 2024-09-17 VITALS
OXYGEN SATURATION: 98 % | RESPIRATION RATE: 18 BRPM | HEIGHT: 71 IN | BODY MASS INDEX: 29.68 KG/M2 | WEIGHT: 212 LBS | DIASTOLIC BLOOD PRESSURE: 80 MMHG | HEART RATE: 85 BPM | TEMPERATURE: 97.5 F | SYSTOLIC BLOOD PRESSURE: 126 MMHG

## 2024-09-17 LAB
ABO GROUP BLD: NORMAL
BLD GP AB SCN SERPL QL: NEGATIVE
GLUCOSE BLDC GLUCOMTR-MCNC: 127 MG/DL (ref 70–105)
GLUCOSE BLDC GLUCOMTR-MCNC: 144 MG/DL (ref 70–105)
RH BLD: POSITIVE
T&S EXPIRATION DATE: NORMAL

## 2024-09-17 PROCEDURE — 86900 BLOOD TYPING SEROLOGIC ABO: CPT | Performed by: NEUROLOGICAL SURGERY

## 2024-09-17 PROCEDURE — 25810000003 LACTATED RINGERS PER 1000 ML: Performed by: ANESTHESIOLOGY

## 2024-09-17 PROCEDURE — 25010000002 FENTANYL CITRATE (PF) 100 MCG/2ML SOLUTION: Performed by: NURSE ANESTHETIST, CERTIFIED REGISTERED

## 2024-09-17 PROCEDURE — 76000 FLUOROSCOPY <1 HR PHYS/QHP: CPT

## 2024-09-17 PROCEDURE — 86850 RBC ANTIBODY SCREEN: CPT | Performed by: NEUROLOGICAL SURGERY

## 2024-09-17 PROCEDURE — 0 BUPIVACAINE LIPOSOME 1.3 % SUSPENSION 10 ML VIAL: Performed by: NEUROLOGICAL SURGERY

## 2024-09-17 PROCEDURE — C9290 INJ, BUPIVACAINE LIPOSOME: HCPCS | Performed by: NEUROLOGICAL SURGERY

## 2024-09-17 PROCEDURE — 25010000002 PROPOFOL 200 MG/20ML EMULSION: Performed by: NURSE ANESTHETIST, CERTIFIED REGISTERED

## 2024-09-17 PROCEDURE — 72100 X-RAY EXAM L-S SPINE 2/3 VWS: CPT

## 2024-09-17 PROCEDURE — 86901 BLOOD TYPING SEROLOGIC RH(D): CPT | Performed by: NEUROLOGICAL SURGERY

## 2024-09-17 PROCEDURE — 25010000002 CEFAZOLIN PER 500 MG: Performed by: NEUROLOGICAL SURGERY

## 2024-09-17 PROCEDURE — 25010000002 SUGAMMADEX 200 MG/2ML SOLUTION: Performed by: NURSE ANESTHETIST, CERTIFIED REGISTERED

## 2024-09-17 PROCEDURE — 25010000002 DEXAMETHASONE PER 1 MG: Performed by: NURSE ANESTHETIST, CERTIFIED REGISTERED

## 2024-09-17 PROCEDURE — 25010000002 VANCOMYCIN 1 G RECONSTITUTED SOLUTION: Performed by: NEUROLOGICAL SURGERY

## 2024-09-17 PROCEDURE — 25010000002 KETOROLAC TROMETHAMINE PER 15 MG: Performed by: NURSE ANESTHETIST, CERTIFIED REGISTERED

## 2024-09-17 PROCEDURE — 25010000002 ONDANSETRON PER 1 MG: Performed by: NURSE ANESTHETIST, CERTIFIED REGISTERED

## 2024-09-17 PROCEDURE — 82948 REAGENT STRIP/BLOOD GLUCOSE: CPT

## 2024-09-17 PROCEDURE — 25810000003 LACTATED RINGERS PER 1000 ML: Performed by: NURSE ANESTHETIST, CERTIFIED REGISTERED

## 2024-09-17 DEVICE — FLOSEAL HEMOSTATIC MATRIX, 10ML
Type: IMPLANTABLE DEVICE | Site: SPINE LUMBAR | Status: FUNCTIONAL
Brand: FLOSEAL HEMOSTATIC MATRIX

## 2024-09-17 RX ORDER — SODIUM CHLORIDE, SODIUM LACTATE, POTASSIUM CHLORIDE, CALCIUM CHLORIDE 600; 310; 30; 20 MG/100ML; MG/100ML; MG/100ML; MG/100ML
INJECTION, SOLUTION INTRAVENOUS CONTINUOUS PRN
Status: DISCONTINUED | OUTPATIENT
Start: 2024-09-17 | End: 2024-09-17 | Stop reason: SURG

## 2024-09-17 RX ORDER — LABETALOL HYDROCHLORIDE 5 MG/ML
5 INJECTION, SOLUTION INTRAVENOUS
Status: DISCONTINUED | OUTPATIENT
Start: 2024-09-17 | End: 2024-09-17 | Stop reason: HOSPADM

## 2024-09-17 RX ORDER — OXYCODONE HYDROCHLORIDE 5 MG/1
7.5 TABLET ORAL EVERY 4 HOURS PRN
Status: DISCONTINUED | OUTPATIENT
Start: 2024-09-17 | End: 2024-09-17 | Stop reason: HOSPADM

## 2024-09-17 RX ORDER — ACETAMINOPHEN 500 MG
1000 TABLET ORAL ONCE
Status: COMPLETED | OUTPATIENT
Start: 2024-09-17 | End: 2024-09-17

## 2024-09-17 RX ORDER — DIPHENHYDRAMINE HYDROCHLORIDE 50 MG/ML
12.5 INJECTION INTRAMUSCULAR; INTRAVENOUS ONCE AS NEEDED
Status: DISCONTINUED | OUTPATIENT
Start: 2024-09-17 | End: 2024-09-17 | Stop reason: HOSPADM

## 2024-09-17 RX ORDER — FENTANYL CITRATE 50 UG/ML
INJECTION, SOLUTION INTRAMUSCULAR; INTRAVENOUS AS NEEDED
Status: DISCONTINUED | OUTPATIENT
Start: 2024-09-17 | End: 2024-09-17 | Stop reason: SURG

## 2024-09-17 RX ORDER — NALOXONE HCL 0.4 MG/ML
0.4 VIAL (ML) INJECTION AS NEEDED
Status: DISCONTINUED | OUTPATIENT
Start: 2024-09-17 | End: 2024-09-17 | Stop reason: HOSPADM

## 2024-09-17 RX ORDER — ONDANSETRON 2 MG/ML
INJECTION INTRAMUSCULAR; INTRAVENOUS AS NEEDED
Status: DISCONTINUED | OUTPATIENT
Start: 2024-09-17 | End: 2024-09-17 | Stop reason: SURG

## 2024-09-17 RX ORDER — SODIUM CHLORIDE, SODIUM LACTATE, POTASSIUM CHLORIDE, CALCIUM CHLORIDE 600; 310; 30; 20 MG/100ML; MG/100ML; MG/100ML; MG/100ML
9 INJECTION, SOLUTION INTRAVENOUS CONTINUOUS PRN
Status: DISCONTINUED | OUTPATIENT
Start: 2024-09-17 | End: 2024-09-17 | Stop reason: HOSPADM

## 2024-09-17 RX ORDER — EPHEDRINE SULFATE 5 MG/ML
5 INJECTION INTRAVENOUS ONCE AS NEEDED
Status: DISCONTINUED | OUTPATIENT
Start: 2024-09-17 | End: 2024-09-17 | Stop reason: HOSPADM

## 2024-09-17 RX ORDER — OXYCODONE AND ACETAMINOPHEN 7.5; 325 MG/1; MG/1
1 TABLET ORAL 4 TIMES DAILY PRN
COMMUNITY

## 2024-09-17 RX ORDER — OXYCODONE HYDROCHLORIDE 5 MG/1
5 TABLET ORAL ONCE AS NEEDED
Status: DISCONTINUED | OUTPATIENT
Start: 2024-09-17 | End: 2024-09-17 | Stop reason: HOSPADM

## 2024-09-17 RX ORDER — FLUMAZENIL 0.1 MG/ML
0.2 INJECTION INTRAVENOUS AS NEEDED
Status: DISCONTINUED | OUTPATIENT
Start: 2024-09-17 | End: 2024-09-17 | Stop reason: HOSPADM

## 2024-09-17 RX ORDER — DIPHENHYDRAMINE HYDROCHLORIDE 50 MG/ML
12.5 INJECTION INTRAMUSCULAR; INTRAVENOUS
Status: DISCONTINUED | OUTPATIENT
Start: 2024-09-17 | End: 2024-09-17 | Stop reason: HOSPADM

## 2024-09-17 RX ORDER — METHOCARBAMOL 100 MG/ML
500 INJECTION, SOLUTION INTRAMUSCULAR; INTRAVENOUS ONCE AS NEEDED
Status: DISCONTINUED | OUTPATIENT
Start: 2024-09-17 | End: 2024-09-17 | Stop reason: HOSPADM

## 2024-09-17 RX ORDER — SODIUM CHLORIDE 0.9 % (FLUSH) 0.9 %
10 SYRINGE (ML) INJECTION EVERY 12 HOURS SCHEDULED
Status: DISCONTINUED | OUTPATIENT
Start: 2024-09-17 | End: 2024-09-17 | Stop reason: HOSPADM

## 2024-09-17 RX ORDER — PROPOFOL 10 MG/ML
INJECTION, EMULSION INTRAVENOUS AS NEEDED
Status: DISCONTINUED | OUTPATIENT
Start: 2024-09-17 | End: 2024-09-17 | Stop reason: SURG

## 2024-09-17 RX ORDER — HYDRALAZINE HYDROCHLORIDE 20 MG/ML
5 INJECTION INTRAMUSCULAR; INTRAVENOUS
Status: DISCONTINUED | OUTPATIENT
Start: 2024-09-17 | End: 2024-09-17 | Stop reason: HOSPADM

## 2024-09-17 RX ORDER — DEXAMETHASONE SODIUM PHOSPHATE 4 MG/ML
INJECTION, SOLUTION INTRA-ARTICULAR; INTRALESIONAL; INTRAMUSCULAR; INTRAVENOUS; SOFT TISSUE AS NEEDED
Status: DISCONTINUED | OUTPATIENT
Start: 2024-09-17 | End: 2024-09-17 | Stop reason: SURG

## 2024-09-17 RX ORDER — VANCOMYCIN HYDROCHLORIDE 1 G/20ML
INJECTION, POWDER, LYOPHILIZED, FOR SOLUTION INTRAVENOUS AS NEEDED
Status: DISCONTINUED | OUTPATIENT
Start: 2024-09-17 | End: 2024-09-17 | Stop reason: HOSPADM

## 2024-09-17 RX ORDER — KETOROLAC TROMETHAMINE 30 MG/ML
INJECTION, SOLUTION INTRAMUSCULAR; INTRAVENOUS AS NEEDED
Status: DISCONTINUED | OUTPATIENT
Start: 2024-09-17 | End: 2024-09-17 | Stop reason: SURG

## 2024-09-17 RX ORDER — PHENYLEPHRINE HCL IN 0.9% NACL 1 MG/10 ML
SYRINGE (ML) INTRAVENOUS AS NEEDED
Status: DISCONTINUED | OUTPATIENT
Start: 2024-09-17 | End: 2024-09-17 | Stop reason: SURG

## 2024-09-17 RX ORDER — IPRATROPIUM BROMIDE AND ALBUTEROL SULFATE 2.5; .5 MG/3ML; MG/3ML
3 SOLUTION RESPIRATORY (INHALATION) ONCE AS NEEDED
Status: DISCONTINUED | OUTPATIENT
Start: 2024-09-17 | End: 2024-09-17 | Stop reason: HOSPADM

## 2024-09-17 RX ORDER — SODIUM CHLORIDE 0.9 % (FLUSH) 0.9 %
10 SYRINGE (ML) INJECTION AS NEEDED
Status: DISCONTINUED | OUTPATIENT
Start: 2024-09-17 | End: 2024-09-17 | Stop reason: HOSPADM

## 2024-09-17 RX ORDER — ROCURONIUM BROMIDE 10 MG/ML
INJECTION, SOLUTION INTRAVENOUS AS NEEDED
Status: DISCONTINUED | OUTPATIENT
Start: 2024-09-17 | End: 2024-09-17 | Stop reason: SURG

## 2024-09-17 RX ORDER — ONDANSETRON 2 MG/ML
4 INJECTION INTRAMUSCULAR; INTRAVENOUS ONCE AS NEEDED
Status: DISCONTINUED | OUTPATIENT
Start: 2024-09-17 | End: 2024-09-17 | Stop reason: HOSPADM

## 2024-09-17 RX ADMIN — ACETAMINOPHEN 500 MG: 500 TABLET, FILM COATED ORAL at 07:00

## 2024-09-17 RX ADMIN — ROCURONIUM BROMIDE 50 MG: 10 INJECTION, SOLUTION INTRAVENOUS at 07:33

## 2024-09-17 RX ADMIN — SUGAMMADEX 200 MG: 100 INJECTION, SOLUTION INTRAVENOUS at 09:18

## 2024-09-17 RX ADMIN — Medication 100 MCG: at 07:58

## 2024-09-17 RX ADMIN — SODIUM CHLORIDE, SODIUM LACTATE, POTASSIUM CHLORIDE, AND CALCIUM CHLORIDE: .6; .31; .03; .02 INJECTION, SOLUTION INTRAVENOUS at 07:29

## 2024-09-17 RX ADMIN — OXYCODONE HYDROCHLORIDE 10 MG: 10 TABLET, FILM COATED, EXTENDED RELEASE ORAL at 07:00

## 2024-09-17 RX ADMIN — DEXAMETHASONE SODIUM PHOSPHATE 8 MG: 4 INJECTION, SOLUTION INTRAMUSCULAR; INTRAVENOUS at 07:55

## 2024-09-17 RX ADMIN — FENTANYL CITRATE 50 MCG: 50 INJECTION, SOLUTION INTRAMUSCULAR; INTRAVENOUS at 08:41

## 2024-09-17 RX ADMIN — PROPOFOL 150 MG: 10 INJECTION, EMULSION INTRAVENOUS at 07:33

## 2024-09-17 RX ADMIN — LIDOCAINE HYDROCHLORIDE 100 MG: 20 INJECTION, SOLUTION EPIDURAL; INFILTRATION; INTRACAUDAL; PERINEURAL at 07:33

## 2024-09-17 RX ADMIN — PROPOFOL 150 MCG/KG/MIN: 10 INJECTION, EMULSION INTRAVENOUS at 07:34

## 2024-09-17 RX ADMIN — KETOROLAC TROMETHAMINE 15 MG: 30 INJECTION, SOLUTION INTRAMUSCULAR at 07:55

## 2024-09-17 RX ADMIN — ROCURONIUM BROMIDE 10 MG: 10 INJECTION, SOLUTION INTRAVENOUS at 08:00

## 2024-09-17 RX ADMIN — FENTANYL CITRATE 50 MCG: 50 INJECTION, SOLUTION INTRAMUSCULAR; INTRAVENOUS at 09:09

## 2024-09-17 RX ADMIN — Medication 15 MG: at 09:14

## 2024-09-17 RX ADMIN — ONDANSETRON 4 MG: 2 INJECTION INTRAMUSCULAR; INTRAVENOUS at 09:17

## 2024-09-17 RX ADMIN — SODIUM CHLORIDE 2000 MG: 900 INJECTION INTRAVENOUS at 07:25

## 2024-09-17 RX ADMIN — SODIUM CHLORIDE, POTASSIUM CHLORIDE, SODIUM LACTATE AND CALCIUM CHLORIDE 9 ML/HR: 600; 310; 30; 20 INJECTION, SOLUTION INTRAVENOUS at 06:31

## 2025-07-23 ENCOUNTER — TELEPHONE (OUTPATIENT)
Dept: CARDIOLOGY | Facility: CLINIC | Age: 50
End: 2025-07-23
Payer: OTHER GOVERNMENT

## 2025-07-23 NOTE — TELEPHONE ENCOUNTER
CALLED PATIENT AND LEFT THIRD MESSAGE.  I HAVE BEEN TRYING TO REACH HIM TO SCHEDULE NEW PATIENT APPT WITH DR. BORJAS.     OK TO OFFER APPT 8/5/25 WITH DR. BORJAS.

## 2025-07-23 NOTE — TELEPHONE ENCOUNTER
Caller: Gurmeet Garcia    Relationship: Self    Best call back number:       PATIENT CALLED BACK TO SCHEDULE, UNABLE TO TRANSFER TO THE OFFICE

## 2025-08-07 ENCOUNTER — OFFICE VISIT (OUTPATIENT)
Dept: CARDIOLOGY | Facility: CLINIC | Age: 50
End: 2025-08-07
Payer: OTHER GOVERNMENT

## 2025-08-07 VITALS
SYSTOLIC BLOOD PRESSURE: 96 MMHG | DIASTOLIC BLOOD PRESSURE: 67 MMHG | BODY MASS INDEX: 36.12 KG/M2 | HEIGHT: 71 IN | WEIGHT: 258 LBS | HEART RATE: 79 BPM | OXYGEN SATURATION: 99 %

## 2025-08-07 DIAGNOSIS — R06.02 SHORTNESS OF BREATH: ICD-10-CM

## 2025-08-07 DIAGNOSIS — I10 PRIMARY HYPERTENSION: Primary | ICD-10-CM

## 2025-08-07 DIAGNOSIS — E78.00 PURE HYPERCHOLESTEROLEMIA: ICD-10-CM

## 2025-08-07 DIAGNOSIS — E11.9 TYPE 2 DIABETES MELLITUS WITHOUT COMPLICATION, WITHOUT LONG-TERM CURRENT USE OF INSULIN: ICD-10-CM

## 2025-08-08 ENCOUNTER — PATIENT ROUNDING (BHMG ONLY) (OUTPATIENT)
Dept: CARDIOLOGY | Facility: CLINIC | Age: 50
End: 2025-08-08
Payer: OTHER GOVERNMENT

## 2025-08-29 ENCOUNTER — HOSPITAL ENCOUNTER (OUTPATIENT)
Dept: CARDIOLOGY | Facility: HOSPITAL | Age: 50
Discharge: HOME | End: 2025-08-29
Payer: OTHER GOVERNMENT

## 2025-08-29 ENCOUNTER — HOSPITAL ENCOUNTER (OUTPATIENT)
Dept: CARDIOLOGY | Facility: HOSPITAL | Age: 50
Discharge: HOME | End: 2025-08-29
Admitting: INTERNAL MEDICINE
Payer: OTHER GOVERNMENT

## 2025-08-29 DIAGNOSIS — R06.02 SHORTNESS OF BREATH: ICD-10-CM

## 2025-08-29 DIAGNOSIS — E11.9 TYPE 2 DIABETES MELLITUS WITHOUT COMPLICATION, WITHOUT LONG-TERM CURRENT USE OF INSULIN: ICD-10-CM

## 2025-08-29 DIAGNOSIS — E78.00 PURE HYPERCHOLESTEROLEMIA: ICD-10-CM

## 2025-08-29 DIAGNOSIS — I10 PRIMARY HYPERTENSION: ICD-10-CM

## 2025-08-29 LAB
AORTIC DIMENSIONLESS INDEX: 0.68 (DI)
AV MEAN PRESS GRAD SYS DOP V1V2: 9.1 MMHG
AV VMAX SYS DOP: 193 CM/SEC
BH CV ECHO MEAS - AO MAX PG: 15 MMHG
BH CV ECHO MEAS - AO ROOT DIAM: 3.1 CM
BH CV ECHO MEAS - AO V2 VTI: 40.4 CM
BH CV ECHO MEAS - AVA(I,D): 2.6 CM2
BH CV ECHO MEAS - EDV(CUBED): 157.4 ML
BH CV ECHO MEAS - EDV(MOD-SP4): 93.3 ML
BH CV ECHO MEAS - EF(MOD-SP4): 59.6 %
BH CV ECHO MEAS - ESV(CUBED): 61.9 ML
BH CV ECHO MEAS - ESV(MOD-SP4): 37.7 ML
BH CV ECHO MEAS - FS: 26.7 %
BH CV ECHO MEAS - IVS/LVPW: 0.91 CM
BH CV ECHO MEAS - IVSD: 0.94 CM
BH CV ECHO MEAS - LA DIMENSION: 4.7 CM
BH CV ECHO MEAS - LAT PEAK E' VEL: 12.6 CM/SEC
BH CV ECHO MEAS - LV DIASTOLIC VOL/BSA (35-75): 39.7 CM2
BH CV ECHO MEAS - LV MASS(C)D: 202 GRAMS
BH CV ECHO MEAS - LV MAX PG: 9.3 MMHG
BH CV ECHO MEAS - LV MEAN PG: 5.4 MMHG
BH CV ECHO MEAS - LV SYSTOLIC VOL/BSA (12-30): 16 CM2
BH CV ECHO MEAS - LV V1 MAX: 152.3 CM/SEC
BH CV ECHO MEAS - LV V1 VTI: 27.7 CM
BH CV ECHO MEAS - LVIDD: 5.4 CM
BH CV ECHO MEAS - LVIDS: 4 CM
BH CV ECHO MEAS - LVOT AREA: 3.9 CM2
BH CV ECHO MEAS - LVOT DIAM: 2.22 CM
BH CV ECHO MEAS - LVPWD: 1.03 CM
BH CV ECHO MEAS - MED PEAK E' VEL: 11.4 CM/SEC
BH CV ECHO MEAS - MV A MAX VEL: 84.1 CM/SEC
BH CV ECHO MEAS - MV DEC SLOPE: 448.9 CM/SEC2
BH CV ECHO MEAS - MV DEC TIME: 0.24 SEC
BH CV ECHO MEAS - MV E MAX VEL: 106 CM/SEC
BH CV ECHO MEAS - MV E/A: 1.26
BH CV ECHO MEAS - MV MAX PG: 9.6 MMHG
BH CV ECHO MEAS - MV MEAN PG: 3.8 MMHG
BH CV ECHO MEAS - MV V2 VTI: 41 CM
BH CV ECHO MEAS - MVA(VTI): 2.6 CM2
BH CV ECHO MEAS - PA ACC TIME: 0.1 SEC
BH CV ECHO MEAS - PA V2 MAX: 117.6 CM/SEC
BH CV ECHO MEAS - RAP SYSTOLE: 3 MMHG
BH CV ECHO MEAS - RV MAX PG: 3.2 MMHG
BH CV ECHO MEAS - RV V1 MAX: 88.9 CM/SEC
BH CV ECHO MEAS - RV V1 VTI: 21.3 CM
BH CV ECHO MEAS - RVDD: 3.2 CM
BH CV ECHO MEAS - RVSP: 14.8 MMHG
BH CV ECHO MEAS - SV(LVOT): 106.8 ML
BH CV ECHO MEAS - SV(MOD-SP4): 55.6 ML
BH CV ECHO MEAS - SVI(LVOT): 45.5 ML/M2
BH CV ECHO MEAS - SVI(MOD-SP4): 23.7 ML/M2
BH CV ECHO MEAS - TAPSE (>1.6): 2.9 CM
BH CV ECHO MEAS - TR MAX PG: 11.8 MMHG
BH CV ECHO MEAS - TR MAX VEL: 171.9 CM/SEC
BH CV ECHO MEASUREMENTS AVERAGE E/E' RATIO: 8.83
BH CV REST NUCLEAR ISOTOPE DOSE: 10.8 MCI
BH CV STRESS BP STAGE 1: NORMAL
BH CV STRESS BP STAGE 2: NORMAL
BH CV STRESS BP STAGE 3: NORMAL
BH CV STRESS BP STAGE 4: NORMAL
BH CV STRESS DURATION MIN STAGE 1: 3
BH CV STRESS DURATION MIN STAGE 2: 3
BH CV STRESS DURATION MIN STAGE 3: 3
BH CV STRESS DURATION MIN STAGE 4: 1
BH CV STRESS DURATION SEC STAGE 1: 0
BH CV STRESS DURATION SEC STAGE 2: 0
BH CV STRESS DURATION SEC STAGE 3: 0
BH CV STRESS DURATION SEC STAGE 4: 0
BH CV STRESS GRADE STAGE 1: 10
BH CV STRESS GRADE STAGE 2: 12
BH CV STRESS GRADE STAGE 3: 14
BH CV STRESS GRADE STAGE 4: 16
BH CV STRESS HR STAGE 1: 113
BH CV STRESS HR STAGE 2: 126
BH CV STRESS HR STAGE 3: 137
BH CV STRESS HR STAGE 4: 149
BH CV STRESS METS STAGE 1: 5
BH CV STRESS METS STAGE 2: 7.5
BH CV STRESS METS STAGE 3: 10
BH CV STRESS METS STAGE 4: 13.5
BH CV STRESS NUCLEAR ISOTOPE DOSE: 33 MCI
BH CV STRESS PROTOCOL 1: NORMAL
BH CV STRESS RECOVERY BP: NORMAL MMHG
BH CV STRESS RECOVERY HR: 98 BPM
BH CV STRESS SPEED STAGE 1: 1.7
BH CV STRESS SPEED STAGE 2: 2.5
BH CV STRESS SPEED STAGE 3: 3.4
BH CV STRESS SPEED STAGE 4: 4.2
BH CV STRESS STAGE 1: 1
BH CV STRESS STAGE 2: 2
BH CV STRESS STAGE 3: 3
BH CV STRESS STAGE 4: 4
MAXIMAL PREDICTED HEART RATE: 173 BPM
PERCENT MAX PREDICTED HR: 86.13 %
SPECT HRT GATED+EF W RNC IV: 64 %
STRESS BASELINE BP: NORMAL MMHG
STRESS BASELINE HR: 76 BPM
STRESS PERCENT HR: 101 %
STRESS POST ESTIMATED WORKLOAD: 10.3 METS
STRESS POST EXERCISE DUR MIN: 10 MIN
STRESS POST PEAK BP: NORMAL MMHG
STRESS POST PEAK HR: 149 BPM
STRESS TARGET HR: 147 BPM

## 2025-08-29 PROCEDURE — 34310000005 TECHNETIUM SESTAMIBI: Performed by: INTERNAL MEDICINE

## 2025-08-29 PROCEDURE — 93017 CV STRESS TEST TRACING ONLY: CPT

## 2025-08-29 PROCEDURE — 93306 TTE W/DOPPLER COMPLETE: CPT

## 2025-08-29 PROCEDURE — A9500 TC99M SESTAMIBI: HCPCS | Performed by: INTERNAL MEDICINE

## 2025-08-29 PROCEDURE — 78452 HT MUSCLE IMAGE SPECT MULT: CPT

## 2025-08-29 RX ADMIN — TECHNETIUM TC 99M SESTAMIBI 1 DOSE: 1 INJECTION INTRAVENOUS at 12:44

## 2025-08-29 RX ADMIN — TECHNETIUM TC 99M SESTAMIBI 1 DOSE: 1 INJECTION INTRAVENOUS at 13:23

## (undated) DEVICE — KT SURG TURNOVER 050

## (undated) DEVICE — PK BASIC SPINE 50

## (undated) DEVICE — DECANTER: Brand: UNBRANDED

## (undated) DEVICE — DRP C/ARMOR

## (undated) DEVICE — ELECTRD BLD EZ CLN STD 6.5IN

## (undated) DEVICE — ANTIBACTERIAL UNDYED BRAIDED (POLYGLACTIN 910), SYNTHETIC ABSORBABLE SUTURE: Brand: COATED VICRYL

## (undated) DEVICE — 6.0MM PRECISION ROUND

## (undated) DEVICE — NEEDLE, QUINCKE, 18GX3.5": Brand: MEDLINE

## (undated) DEVICE — SPONGE,LAP,12"X12",XR,ST,5/PK,40PK/CS: Brand: MEDLINE

## (undated) DEVICE — Device

## (undated) DEVICE — SPONGE,NEURO,.75"X.75",XR,STRL,LF,10/PK: Brand: MEDLINE

## (undated) DEVICE — GLV SURG SENSICARE PI ORTHO SZ8 LF STRL

## (undated) DEVICE — ADHS SKIN PREMIERPRO EXOFIN TOPICAL HI/VISC .5ML

## (undated) DEVICE — SOL LACTATED RINGER 1000ML

## (undated) DEVICE — SYR LUERLOK 30CC

## (undated) DEVICE — SOLUTION,WATER,IRRIGATION,1000ML,STERILE: Brand: MEDLINE

## (undated) DEVICE — DRSNG WND BORDR/ADHS NONADHR/GZ LF 4X4IN STRL